# Patient Record
Sex: MALE | Race: WHITE | NOT HISPANIC OR LATINO | Employment: FULL TIME | URBAN - METROPOLITAN AREA
[De-identification: names, ages, dates, MRNs, and addresses within clinical notes are randomized per-mention and may not be internally consistent; named-entity substitution may affect disease eponyms.]

---

## 2017-06-19 ENCOUNTER — ALLSCRIPTS OFFICE VISIT (OUTPATIENT)
Dept: OTHER | Facility: OTHER | Age: 52
End: 2017-06-19

## 2018-01-13 VITALS
HEART RATE: 60 BPM | BODY MASS INDEX: 37.19 KG/M2 | TEMPERATURE: 97.7 F | WEIGHT: 315 LBS | RESPIRATION RATE: 18 BRPM | HEIGHT: 77 IN | DIASTOLIC BLOOD PRESSURE: 72 MMHG | SYSTOLIC BLOOD PRESSURE: 122 MMHG

## 2018-05-27 DIAGNOSIS — I10 ESSENTIAL HYPERTENSION: Primary | ICD-10-CM

## 2018-05-29 RX ORDER — IRBESARTAN 150 MG/1
TABLET ORAL
Qty: 90 TABLET | Refills: 0 | Status: SHIPPED | OUTPATIENT
Start: 2018-05-29 | End: 2018-08-27 | Stop reason: SDUPTHER

## 2018-05-29 RX ORDER — AMLODIPINE BESYLATE 5 MG/1
TABLET ORAL
COMMUNITY
Start: 2018-03-25 | End: 2018-05-30 | Stop reason: SDUPTHER

## 2018-05-29 RX ORDER — ALBUTEROL SULFATE 90 UG/1
2 AEROSOL, METERED RESPIRATORY (INHALATION)
COMMUNITY
End: 2018-05-30 | Stop reason: SDUPTHER

## 2018-05-29 RX ORDER — HYDROCHLOROTHIAZIDE 12.5 MG/1
TABLET ORAL
COMMUNITY
Start: 2018-03-07 | End: 2018-05-30 | Stop reason: SDUPTHER

## 2018-05-30 ENCOUNTER — OFFICE VISIT (OUTPATIENT)
Dept: FAMILY MEDICINE CLINIC | Facility: CLINIC | Age: 53
End: 2018-05-30
Payer: COMMERCIAL

## 2018-05-30 VITALS
HEART RATE: 92 BPM | TEMPERATURE: 97.3 F | WEIGHT: 315 LBS | RESPIRATION RATE: 16 BRPM | DIASTOLIC BLOOD PRESSURE: 80 MMHG | BODY MASS INDEX: 37.19 KG/M2 | SYSTOLIC BLOOD PRESSURE: 140 MMHG | HEIGHT: 77 IN

## 2018-05-30 DIAGNOSIS — Z12.11 SCREENING FOR COLON CANCER: ICD-10-CM

## 2018-05-30 DIAGNOSIS — J45.20 MILD INTERMITTENT ASTHMA WITHOUT COMPLICATION: Primary | ICD-10-CM

## 2018-05-30 DIAGNOSIS — E66.8 MODERATE OBESITY: ICD-10-CM

## 2018-05-30 DIAGNOSIS — N18.1 CKD (CHRONIC KIDNEY DISEASE) STAGE 1, GFR 90 ML/MIN OR GREATER: ICD-10-CM

## 2018-05-30 DIAGNOSIS — Z71.85 VACCINE COUNSELING: ICD-10-CM

## 2018-05-30 DIAGNOSIS — I10 BENIGN ESSENTIAL HYPERTENSION: ICD-10-CM

## 2018-05-30 PROBLEM — E66.9 MODERATE OBESITY: Status: ACTIVE | Noted: 2017-06-19

## 2018-05-30 PROCEDURE — 99214 OFFICE O/P EST MOD 30 MIN: CPT | Performed by: FAMILY MEDICINE

## 2018-05-30 PROCEDURE — 3008F BODY MASS INDEX DOCD: CPT | Performed by: FAMILY MEDICINE

## 2018-05-30 PROCEDURE — 1036F TOBACCO NON-USER: CPT | Performed by: FAMILY MEDICINE

## 2018-05-30 RX ORDER — AMLODIPINE BESYLATE 5 MG/1
5 TABLET ORAL DAILY
Qty: 90 TABLET | Refills: 3 | Status: SHIPPED | OUTPATIENT
Start: 2018-05-30 | End: 2019-03-02 | Stop reason: SDUPTHER

## 2018-05-30 RX ORDER — HYDROCHLOROTHIAZIDE 12.5 MG/1
12.5 TABLET ORAL DAILY
Qty: 90 TABLET | Refills: 3 | Status: SHIPPED | OUTPATIENT
Start: 2018-05-30 | End: 2019-03-02 | Stop reason: SDUPTHER

## 2018-05-30 RX ORDER — ALBUTEROL SULFATE 90 UG/1
2 AEROSOL, METERED RESPIRATORY (INHALATION) EVERY 4 HOURS PRN
Qty: 3 INHALER | Refills: 3 | Status: SHIPPED | OUTPATIENT
Start: 2018-05-30 | End: 2020-04-04 | Stop reason: SDUPTHER

## 2018-05-30 NOTE — PROGRESS NOTES
Chief Complaint   Patient presents with    Follow-up    Blood Pressure Check        Patient ID: Adele Allred  is a 46 y o  male  HPI  Pt is seeing for f/u Asthma, HTN, Obesity, CRD -  All stable, taking meds as Rx    The following portions of the patient's history were reviewed and updated as appropriate: allergies, current medications, past family history, past medical history, past social history, past surgical history and problem list     Review of Systems   Constitutional: Negative  Respiratory: Negative  Cardiovascular: Negative  Gastrointestinal: Negative  Genitourinary: Negative  Musculoskeletal: Negative  Skin: Negative  Neurological: Negative  Current Outpatient Prescriptions   Medication Sig Dispense Refill    albuterol (PROAIR HFA) 90 mcg/act inhaler Inhale 2 puffs every 4 (four) hours as needed for wheezing 3 Inhaler 3    amLODIPine (NORVASC) 5 mg tablet Take 1 tablet (5 mg total) by mouth daily 90 tablet 3    hydrochlorothiazide (HYDRODIURIL) 12 5 mg tablet Take 1 tablet (12 5 mg total) by mouth daily 90 tablet 3    irbesartan (AVAPRO) 150 mg tablet TAKE 1 TABLET DAILY 90 tablet 0    Mometasone Furo-Formoterol Fum (DULERA) 200-5 MCG/ACT AERO Inhale 2 puffs 2 (two) times a day 3 Inhaler 3     No current facility-administered medications for this visit  Objective:    /80 (BP Location: Left arm, Patient Position: Sitting, Cuff Size: Large)   Pulse 92   Temp (!) 97 3 °F (36 3 °C) (Tympanic)   Resp 16   Ht 6' 5" (1 956 m)   Wt (!) 164 kg (361 lb)   BMI 42 81 kg/m²        Physical Exam   Constitutional: He is oriented to person, place, and time  No distress  Obese    Cardiovascular: Normal rate and regular rhythm  No murmur heard  Pulmonary/Chest: Effort normal and breath sounds normal  No respiratory distress  He has no wheezes  Neurological: He is oriented to person, place, and time  No cranial nerve deficit     Psychiatric: He has a normal mood and affect  Labs in chart were reviewed  Assessment/Plan:         Diagnoses and all orders for this visit:    Mild intermittent asthma without complication  -     albuterol (PROAIR HFA) 90 mcg/act inhaler; Inhale 2 puffs every 4 (four) hours as needed for wheezing  -     Mometasone Furo-Formoterol Fum (DULERA) 200-5 MCG/ACT AERO; Inhale 2 puffs 2 (two) times a day    Benign essential hypertension  -     amLODIPine (NORVASC) 5 mg tablet; Take 1 tablet (5 mg total) by mouth daily  -     hydrochlorothiazide (HYDRODIURIL) 12 5 mg tablet; Take 1 tablet (12 5 mg total) by mouth daily  -     Comprehensive metabolic panel; Future  -     Lipid panel; Future  -     Comprehensive metabolic panel  -     Lipid panel  Weight loss, low Na diet, exercises advised   CKD (chronic kidney disease) stage 1, GFR 90 ml/min or greater    Moderate obesity  -     Hemoglobin A1c;  Future  -     Hemoglobin A1c    rto in 6 m                             Vinicius Sun MD

## 2018-05-31 NOTE — PATIENT INSTRUCTIONS
Low-Sodium Diet   AMBULATORY CARE:   A low-sodium diet  limits foods that are high in sodium (salt)  You will need to follow a low-sodium diet if you have high blood pressure, kidney disease, or heart failure  You may also need to follow this diet if you have a condition that is causing your body to retain (hold) extra fluid  You may need to limit the amount of sodium you eat to 1,500 mg  Ask your healthcare provider how much sodium you can have each day  How to use food labels to choose foods that are low in sodium:  Read food labels to find the amount of sodium they contain  The amount of sodium is listed in milligrams (mg)  The % Daily Value (DV) column tells you how much of your daily needs are met by 1 serving of the food for each nutrient listed  Choose foods that have less than 5% of the DV of sodium  These foods are considered low in sodium  Foods that have 20% or more of the DV of sodium are considered high in sodium  Some food labels may also list any of the following terms that tell you about the sodium content in the food:  · Sodium-free:  Less than 5 mg in each serving    · Very low sodium:  35 mg of sodium or less in each serving    · Low sodium:  140 mg of sodium or less in each serving    · Reduced sodium: At least 25% less sodium in each serving than the regular type    · Light in sodium:  50% less sodium in each serving    · Unsalted or no added salt:  No extra salt is added during processing (the food may still contain sodium)  Foods to avoid:  Salty foods are high in sodium   You should avoid the following:  · Processed foods:      ¨ Mixes for cornbread, biscuits, cake, and pudding     ¨ Instant foods, such as potatoes, cereals, noodles, and rice     ¨ Packaged foods, such as bread stuffing, rice and pasta mixes, snack dip mixes, and macaroni and cheese     ¨ Canned foods, such as canned vegetables, soups, broths, sauces, and vegetable or tomato juice    ¨ Snack foods, such as salted chips, popcorn, pretzels, pork rinds, salted crackers, and salted nuts    ¨ Frozen foods, such as dinners, entrees, vegetables with sauces, and breaded meats    ¨ Sauerkraut, pickled vegetables, and other foods prepared in brine    · Meats and cheeses:      ¨ Smoked or cured meat, such as corned beef, eldridge, ham, hot dogs, and sausage    ¨ Canned meats or spreads, such as potted meats, sardines, anchovies, and imitation seafood    ¨ Deli or lunch meats, such as bologna, ham, turkey, and roast beef    ¨ Processed cheese, such as American cheese and cheese spreads    · Condiments, sauces, and seasonings:      ¨ Salt (¼ teaspoon of salt contains 575 mg of sodium)    ¨ Seasonings made with salt, such as garlic salt, celery salt, onion salt, and seasoned salt    ¨ Regular soy sauce, barbecue sauce, teriyaki sauce, steak sauce, Worcestershire sauce, and most flavored vinegars    ¨ Canned gravy and mixes     ¨ Regular condiments, such as mustard, ketchup, and salad dressings    ¨ Pickles and olives    ¨ Meat tenderizers and monosodium glutamate (MSG)  Foods to include:  Read the food label to find the exact amount of sodium in each serving  · Bread and cereal:  Try to choose breads with less than 80 mg of sodium per serving  ¨ Bread, roll, argenis, tortilla, or unsalted crackers  ¨ Ready-to-eat cereals with less than 5% DV of sodium (examples include shredded wheat and puffed rice)    ¨ Pasta    · Vegetables and fruits:      ¨ Unsalted fresh, frozen, or canned vegetables    ¨ Fresh, frozen, or canned fruits    ¨ Fruit juice    · Dairy:  One serving has about 150 mg of sodium  ¨ Milk, all types    ¨ Yogurt    ¨ Hard cheese, such as cheddar, Swiss, [employer], or mozzarella    · Meat and other protein foods:  Some raw meats may have added sodium       ¨ Plain meats, fish, and poultry     ¨ Eggs    · Other foods:      ¨ Homemade pudding    ¨ Unsalted nuts, popcorn, or pretzels    ¨ Unsalted butter or margarine  Ways to decrease sodium:   · Add spices and herbs to foods instead of salt during cooking  Use salt-free seasonings to add flavor to foods  Examples include onion powder, garlic powder, basil, timmons powder, paprika, and parsley  Try lemon or lime juice or vinegar to give foods a tart flavor  Use hot peppers, pepper, or cayenne pepper to add a spicy flavor to foods  · Do not keep a salt shaker at your kitchen table  This may help keep you from adding salt to food at the table  It may take time to get used to enjoying the natural flavor of food instead of adding salt  Talk to your healthcare provider before you use salt substitutes  Some salt substitutes have a high amount of potassium and need to be avoided if you have kidney disease  · Choose low-sodium foods at restaurants  Meals from restaurants are often high in sodium  Some restaurants have nutrition information on the menu that tells you the amount of sodium in their foods  If possible, ask for your food to be prepared with less, or no salt  · Shop for unsalted or low-sodium foods and snacks at the grocery store  Examples include unsalted or low-sodium broths, soups, and canned vegetables  Choose fresh or frozen vegetables instead  Choose unsalted nuts or seeds or fresh fruits or vegetables as snacks  Read food labels and choose salt-free, very low-sodium, or low-sodium foods  © 2017 [employer] Information is for End User's use only and may not be sold, redistributed or otherwise used for commercial purposes  All illustrations and images included in CareNotes® are the copyrighted property of [employer]  or [employer]  The above information is an  only  It is not intended as medical advice for individual conditions or treatments  Talk to your doctor, nurse or pharmacist before following any medical regimen to see if it is safe and effective for you

## 2018-08-27 DIAGNOSIS — I10 ESSENTIAL HYPERTENSION: ICD-10-CM

## 2018-08-27 RX ORDER — IRBESARTAN 150 MG/1
TABLET ORAL
Qty: 90 TABLET | Refills: 3 | Status: SHIPPED | OUTPATIENT
Start: 2018-08-27 | End: 2019-03-02 | Stop reason: SDUPTHER

## 2019-01-09 ENCOUNTER — OFFICE VISIT (OUTPATIENT)
Dept: FAMILY MEDICINE CLINIC | Facility: CLINIC | Age: 54
End: 2019-01-09
Payer: COMMERCIAL

## 2019-01-09 VITALS
TEMPERATURE: 97.7 F | RESPIRATION RATE: 18 BRPM | SYSTOLIC BLOOD PRESSURE: 140 MMHG | BODY MASS INDEX: 37.19 KG/M2 | HEIGHT: 77 IN | WEIGHT: 315 LBS | DIASTOLIC BLOOD PRESSURE: 80 MMHG | HEART RATE: 84 BPM

## 2019-01-09 DIAGNOSIS — I10 BENIGN ESSENTIAL HYPERTENSION: Primary | ICD-10-CM

## 2019-01-09 DIAGNOSIS — E66.01 MORBID (SEVERE) OBESITY DUE TO EXCESS CALORIES (HCC): ICD-10-CM

## 2019-01-09 DIAGNOSIS — J45.20 MILD INTERMITTENT ASTHMA WITHOUT COMPLICATION: ICD-10-CM

## 2019-01-09 DIAGNOSIS — Z12.11 ENCOUNTER FOR SCREENING FOR MALIGNANT NEOPLASM OF COLON: ICD-10-CM

## 2019-01-09 DIAGNOSIS — N18.1 CKD (CHRONIC KIDNEY DISEASE) STAGE 1, GFR 90 ML/MIN OR GREATER: ICD-10-CM

## 2019-01-09 PROCEDURE — 99214 OFFICE O/P EST MOD 30 MIN: CPT | Performed by: FAMILY MEDICINE

## 2019-01-09 PROCEDURE — 1036F TOBACCO NON-USER: CPT | Performed by: FAMILY MEDICINE

## 2019-01-09 PROCEDURE — 3008F BODY MASS INDEX DOCD: CPT | Performed by: FAMILY MEDICINE

## 2019-01-09 NOTE — PROGRESS NOTES
Chief Complaint   Patient presents with    Follow-up    Blood Pressure Check   multiple issues      Patient ID: Debora Bowden is a 48 y o  male  HPI  Pt is seeing for f/u HTN, Asthma, MO, CRD -  All stable, lost 20 lb in 2 m with diet and exercises  -  Taking meds as Rx     The following portions of the patient's history were reviewed and updated as appropriate: allergies, current medications, past family history, past medical history, past social history, past surgical history and problem list     Review of Systems   Constitutional: Negative  Respiratory: Negative  Cardiovascular: Negative  Gastrointestinal: Negative  Genitourinary: Negative  Musculoskeletal: Negative  Skin: Negative  Neurological: Negative  Current Outpatient Prescriptions   Medication Sig Dispense Refill    albuterol (PROAIR HFA) 90 mcg/act inhaler Inhale 2 puffs every 4 (four) hours as needed for wheezing 3 Inhaler 3    amLODIPine (NORVASC) 5 mg tablet Take 1 tablet (5 mg total) by mouth daily 90 tablet 3    hydrochlorothiazide (HYDRODIURIL) 12 5 mg tablet Take 1 tablet (12 5 mg total) by mouth daily 90 tablet 3    irbesartan (AVAPRO) 150 mg tablet TAKE 1 TABLET DAILY 90 tablet 3    Mometasone Furo-Formoterol Fum (DULERA) 200-5 MCG/ACT AERO Inhale 2 puffs 2 (two) times a day 3 Inhaler 3     No current facility-administered medications for this visit  Objective:    /80 (BP Location: Left arm, Patient Position: Sitting, Cuff Size: Large)   Pulse 84   Temp 97 7 °F (36 5 °C) (Tympanic)   Resp 18   Ht 6' 5" (1 956 m)   Wt (!) 156 kg (344 lb)   BMI 40 79 kg/m²        Physical Exam   Constitutional: He is oriented to person, place, and time  No distress  Eyes: EOM are normal    Cardiovascular: Normal rate, regular rhythm and normal heart sounds  Exam reveals no gallop  No murmur heard  Pulmonary/Chest: Effort normal and breath sounds normal  No respiratory distress  He has no wheezes  Problem: Patient Care Overview (Adult)  Goal: Plan of Care Review  Outcome: Ongoing (interventions implemented as appropriate)    09/22/17 1034   Coping/Psychosocial Response Interventions   Plan Of Care Reviewed With patient   Patient Care Overview   Progress no change            He has no rales  Musculoskeletal: He exhibits no edema or tenderness  Neurological: He is oriented to person, place, and time  No cranial nerve deficit  Assessment/Plan:         Diagnoses and all orders for this visit:    Benign essential hypertension  -     Hemoglobin A1C; Future  -     Lipid panel; Future  -     Comprehensive metabolic panel; Future    Encounter for screening for malignant neoplasm of colon  -     Ambulatory referral to Gastroenterology;  Future    Mild intermittent asthma without complication    Morbid (severe) obesity due to excess calories (HCC)    CKD (chronic kidney disease) stage 1, GFR 90 ml/min or greater        All stable  Cont meds  Weight loss advised     rto in 6 m                     Paradise Stephen MD

## 2019-03-02 DIAGNOSIS — I10 BENIGN ESSENTIAL HYPERTENSION: ICD-10-CM

## 2019-03-02 DIAGNOSIS — I10 ESSENTIAL HYPERTENSION: ICD-10-CM

## 2019-03-02 NOTE — TELEPHONE ENCOUNTER
Dr Chang Mccullough    Patient is requesting refills on irbesartan, hydroclorothiazide and amlodipine sent to [facility]  Patient was advised that Dr Chang Mccullough is not in office today

## 2019-03-04 RX ORDER — AMLODIPINE BESYLATE 5 MG/1
5 TABLET ORAL DAILY
Qty: 90 TABLET | Refills: 3 | Status: SHIPPED | OUTPATIENT
Start: 2019-03-04 | End: 2020-04-17

## 2019-03-04 RX ORDER — HYDROCHLOROTHIAZIDE 12.5 MG/1
12.5 TABLET ORAL DAILY
Qty: 90 TABLET | Refills: 3 | Status: SHIPPED | OUTPATIENT
Start: 2019-03-04 | End: 2020-04-17

## 2019-03-04 RX ORDER — IRBESARTAN 150 MG/1
150 TABLET ORAL DAILY
Qty: 90 TABLET | Refills: 3 | Status: SHIPPED | OUTPATIENT
Start: 2019-03-04 | End: 2019-03-11 | Stop reason: ALTCHOICE

## 2019-03-09 ENCOUNTER — TELEPHONE (OUTPATIENT)
Dept: FAMILY MEDICINE CLINIC | Facility: CLINIC | Age: 54
End: 2019-03-09

## 2019-03-11 DIAGNOSIS — I10 BENIGN ESSENTIAL HYPERTENSION: Primary | ICD-10-CM

## 2019-03-11 RX ORDER — LOSARTAN POTASSIUM 100 MG/1
100 TABLET ORAL DAILY
Qty: 90 TABLET | Refills: 3 | Status: SHIPPED | OUTPATIENT
Start: 2019-03-11 | End: 2020-02-17

## 2019-03-27 ENCOUNTER — OFFICE VISIT (OUTPATIENT)
Dept: FAMILY MEDICINE CLINIC | Facility: CLINIC | Age: 54
End: 2019-03-27
Payer: COMMERCIAL

## 2019-03-27 VITALS
HEIGHT: 77 IN | DIASTOLIC BLOOD PRESSURE: 70 MMHG | WEIGHT: 315 LBS | HEART RATE: 72 BPM | RESPIRATION RATE: 14 BRPM | TEMPERATURE: 97.9 F | SYSTOLIC BLOOD PRESSURE: 120 MMHG | BODY MASS INDEX: 37.19 KG/M2

## 2019-03-27 DIAGNOSIS — H61.21 HEARING LOSS OF RIGHT EAR DUE TO CERUMEN IMPACTION: Primary | ICD-10-CM

## 2019-03-27 DIAGNOSIS — H60.592 OTHER NONINFECTIVE ACUTE OTITIS EXTERNA OF LEFT EAR: ICD-10-CM

## 2019-03-27 DIAGNOSIS — H61.21 RIGHT EAR IMPACTED CERUMEN: ICD-10-CM

## 2019-03-27 PROCEDURE — 99213 OFFICE O/P EST LOW 20 MIN: CPT | Performed by: NURSE PRACTITIONER

## 2019-03-27 PROCEDURE — 69210 REMOVE IMPACTED EAR WAX UNI: CPT | Performed by: NURSE PRACTITIONER

## 2019-03-27 PROCEDURE — 1036F TOBACCO NON-USER: CPT | Performed by: NURSE PRACTITIONER

## 2019-03-27 PROCEDURE — 3008F BODY MASS INDEX DOCD: CPT | Performed by: NURSE PRACTITIONER

## 2019-03-27 NOTE — PROGRESS NOTES
Ear cerumen removal  Date/Time: 3/27/2019 9:09 AM  Performed by: Flex Delgadillo by: [employer], [address]     Patient location:  Bedside  Indications / Diagnosis:  Right ear pain, hearing loss  Other Assisting Provider: Yes (comment) (AGNES, LPN)    Consent:     Consent obtained:  Verbal    Consent given by:  Patient    Risks discussed:  Bleeding, dizziness, infection, incomplete removal, pain and TM perforation    Alternatives discussed:  Alternative treatment (patient opts to proceed with in office wax removal)  Procedure details:     Local anesthetic:  None    Location:  R ear    Procedure type: curette      Approach:  Natural orifice  Post-procedure details:     Complication:  None    Hearing quality:  Improved    Patient tolerance of procedure:   Tolerated well, no immediate complications  Comments:      Left ext canal erythematous

## 2019-03-27 NOTE — PATIENT INSTRUCTIONS
Cerumen Impaction   WHAT YOU NEED TO KNOW:   Cerumen impaction is the blockage of the outer ear canal by tightly packed cerumen (earwax)  It is generally treated with procedures such as flushing or suctioning the ear canal or the use of instruments to remove the impaction  DISCHARGE INSTRUCTIONS:   Medicines:  · Ear drops: These are used to soften the wax in your ear  Wax softening ear drops may be bought without a prescription  Ask your healthcare provider how often you should use this medicine  Read the instructions carefully before you use the ear drops  Do the following when you put in ear drops:     ¨ Warm the drops by holding the bottle in your hands for a few minutes  Cold ear drops may make you dizzy  ¨ Lie down with the affected ear toward the ceiling  You may also stand with your head tilted to one side  ¨ Pull your ear lobe up and back, and place the correct number of drops into the ear  ¨ Keep your ear facing up for 5 to 10 minutes so the drops coat the outer ear canal      ¨ Gently clean the outer part of the ear with a cotton swab  Do not  place the cotton swab or anything inside your ear canal  This increases the risk of damaging your eardrum  · Take your medicine as directed  Contact your healthcare provider if you think your medicine is not helping or if you have side effects  Tell him of her if you are allergic to any medicine  Keep a list of the medicines, vitamins, and herbs you take  Include the amounts, and when and why you take them  Bring the list or the pill bottles to follow-up visits  Carry your medicine list with you in case of an emergency  Follow up with your healthcare provider as directed:  Write down your questions so you remember to ask them during your visits  Contact your healthcare provider if:   · You have a fever  · You have trouble hearing or ringing in your ear  · You have questions about your condition or care    Return to the emergency department if:   · You feel dizzy  · You have discharge or blood coming out of your ear  · Your ear pain does not go away or gets worse  © 2017 [employer] Information is for End User's use only and may not be sold, redistributed or otherwise used for commercial purposes  All illustrations and images included in CareNotes® are the copyrighted property of [employer]  or [employer]  The above information is an  only  It is not intended as medical advice for individual conditions or treatments  Talk to your doctor, nurse or pharmacist before following any medical regimen to see if it is safe and effective for you

## 2019-07-09 ENCOUNTER — TELEPHONE (OUTPATIENT)
Dept: FAMILY MEDICINE CLINIC | Facility: CLINIC | Age: 54
End: 2019-07-09

## 2019-07-09 NOTE — TELEPHONE ENCOUNTER
Attempted to call pt - voicemail is full, unable to leave [facility] is currently temporarily out of stock - is patient taking this medication? If so, we can send a therapeutic alternative

## 2019-10-07 ENCOUNTER — OFFICE VISIT (OUTPATIENT)
Dept: FAMILY MEDICINE CLINIC | Facility: CLINIC | Age: 54
End: 2019-10-07
Payer: COMMERCIAL

## 2019-10-07 VITALS
SYSTOLIC BLOOD PRESSURE: 128 MMHG | RESPIRATION RATE: 18 BRPM | TEMPERATURE: 98.5 F | HEIGHT: 77 IN | WEIGHT: 310.2 LBS | HEART RATE: 64 BPM | DIASTOLIC BLOOD PRESSURE: 70 MMHG | BODY MASS INDEX: 36.63 KG/M2

## 2019-10-07 DIAGNOSIS — Z23 NEED FOR INFLUENZA VACCINATION: ICD-10-CM

## 2019-10-07 DIAGNOSIS — M54.32 SCIATICA OF LEFT SIDE: Primary | ICD-10-CM

## 2019-10-07 PROCEDURE — 99214 OFFICE O/P EST MOD 30 MIN: CPT | Performed by: FAMILY MEDICINE

## 2019-10-07 PROCEDURE — 90682 RIV4 VACC RECOMBINANT DNA IM: CPT | Performed by: FAMILY MEDICINE

## 2019-10-07 PROCEDURE — 3008F BODY MASS INDEX DOCD: CPT | Performed by: FAMILY MEDICINE

## 2019-10-07 PROCEDURE — 90471 IMMUNIZATION ADMIN: CPT | Performed by: FAMILY MEDICINE

## 2019-10-07 RX ORDER — CYCLOBENZAPRINE HCL 10 MG
10 TABLET ORAL
Qty: 30 TABLET | Refills: 0 | Status: SHIPPED | OUTPATIENT
Start: 2019-10-07 | End: 2019-11-07 | Stop reason: SDUPTHER

## 2019-10-07 RX ORDER — METHYLPREDNISOLONE 4 MG/1
TABLET ORAL
Qty: 21 EACH | Refills: 0 | Status: SHIPPED | OUTPATIENT
Start: 2019-10-07 | End: 2019-12-07

## 2019-10-07 RX ORDER — ACETAMINOPHEN AND CODEINE PHOSPHATE 300; 30 MG/1; MG/1
1 TABLET ORAL EVERY 4 HOURS PRN
Qty: 30 TABLET | Refills: 0 | Status: SHIPPED | OUTPATIENT
Start: 2019-10-07 | End: 2020-07-25

## 2019-10-10 ENCOUNTER — EVALUATION (OUTPATIENT)
Dept: PHYSICAL THERAPY | Facility: CLINIC | Age: 54
End: 2019-10-10
Payer: COMMERCIAL

## 2019-10-10 DIAGNOSIS — M54.32 SCIATICA OF LEFT SIDE: ICD-10-CM

## 2019-10-10 PROCEDURE — 97112 NEUROMUSCULAR REEDUCATION: CPT | Performed by: PHYSICAL THERAPIST

## 2019-10-10 PROCEDURE — 97161 PT EVAL LOW COMPLEX 20 MIN: CPT | Performed by: PHYSICAL THERAPIST

## 2019-10-10 NOTE — PROGRESS NOTES
PT Evaluation     Today's date: 10/10/2019  Patient name: Pennelope Phalen  : 1965  MRN: [id number]  Referring provider: Janna Kaur MD  Dx:   Encounter Diagnosis     ICD-10-CM    1  Sciatica of left side M54 32 Ambulatory referral to Physical Therapy                  Assessment  Assessment details: Jony Reyes Sr presents with signs and symptoms consistent with Sciatica of left side, with loss of range of motion, strength and spinal stabilization  Presents with high reactivity  [facility]  would benefit with physical therapy to address these impairments to return to prior level of function  Impairments: abnormal or restricted ROM, activity intolerance, impaired physical strength, lacks appropriate home exercise program and pain with function    Goals  STG  Initiate HEP  Able to walk 1/2 mile without pain in 3 weeks  LTG  Independent with HEP  Able to walk 1 mile in 6 weeks      Plan  Planned therapy interventions: joint mobilization, manual therapy, neuromuscular re-education, patient education, strengthening, stretching, therapeutic exercise and home exercise program  Frequency: 2x week  Duration in visits: 12  Duration in weeks: 6  Treatment plan discussed with: patient        Subjective Evaluation    History of Present Illness  Mechanism of injury: Patient reports developing left leg radiating pain about 3 weeks ago, sleeping on a poor mattress  He is seeing a chiroprocter, and has never had this before            Recurrent probem    Pain  Current pain rating: 3  At best pain rating: 3  At worst pain ratin  Location: left low back to anterior thigh  Quality: cramping and radiating  Relieving factors: rest  Aggravating factors: standing and walking  Progression: no change    Treatments  Current treatment: physical therapy  Patient Goals  Patient goals for therapy: decreased pain, increased motion, increased strength and independence with ADLs/IADLs          Objective Concurrent Complaints  Negative for night pain, disturbed sleep, bladder dysfunction, bowel dysfunction, history of cancer, history of trauma and infection    Active Range of Motion     Lumbar   Flexion: 40 degrees  with pain  Extension: 10 degrees  with pain    Strength/Myotome Testing     Left Hip   Planes of Motion   Flexion: 5    Right Hip   Planes of Motion   Flexion: 5    Left Knee   Flexion: 5  Extension: 5    Right Knee   Flexion: 5  Extension: 5    Left Ankle/Foot   Dorsiflexion: 5  Plantar flexion: 5  Great toe extension: 5    Right Ankle/Foot   Dorsiflexion: 5  Plantar flexion: 5  Great toe extension: 5      Flowsheet Rows      Most Recent Value   PT/OT G-Codes   Current Score  32   Projected Score  60             Precautions: Asthma, HTN        Myofascial Chain Tests   Tests Left Side Right Side Comments Total   Supine Pelvic Tilt 0 p 0 d     Supine Bridging 0 p 0 d     Prone Bridging 0 p 0 d     Side-lying Hip Abduction 0 d 0 d     Side-lying Hip Adduction 0 p 0 d     Total Scores 0/15  0/15  Overall score 0/30

## 2019-10-14 ENCOUNTER — OFFICE VISIT (OUTPATIENT)
Dept: PHYSICAL THERAPY | Facility: CLINIC | Age: 54
End: 2019-10-14
Payer: COMMERCIAL

## 2019-10-14 DIAGNOSIS — M54.32 SCIATICA OF LEFT SIDE: Primary | ICD-10-CM

## 2019-10-14 PROCEDURE — 97112 NEUROMUSCULAR REEDUCATION: CPT

## 2019-10-14 PROCEDURE — 97140 MANUAL THERAPY 1/> REGIONS: CPT

## 2019-10-14 PROCEDURE — 97110 THERAPEUTIC EXERCISES: CPT

## 2019-10-14 NOTE — PROGRESS NOTES
Daily Note      Today's date: 10/14/2019  Patient name: Karen Singh  : 1965  MRN: [id number]  Referring provider: Lazarus Luther MD  Dx:   Encounter Diagnosis     ICD-10-CM    1  Sciatica of left side M54 32        Subjective: Pt reports that he is beginning to feel better but has increased soreness in his spine because he was weedwacking/doing yard work yesterday  Pt states he is able to push himself to walk farther  Pt also states he rode exercise bike for 3 miles and has soreness in L hip  Objective: See treatment diary below    Lumbar AROM     Flexion Fingertips 2" proximal to toes   Extension 50%   L lateral flexion 15% with pain   R lateral flexion 50%    L rotation 70%   R rotation 80%     Repeated motions Repetitions performed Centralized/  peripheralized? Effect on ROM   Flexion  10 Neither Increased   Extension 1 Peripheralized N/A       Assessment: Pt tolerated treatment fair  Pt attempted hamstring stretching and noted sharp increase in pain to 7/10  Pt introduced to TA activation with clamshells, bridging, and hip adduction  Provided pt with HEP including TA activation progression and lumbar flexion stretching  Plan: Progress treatment as tolerated            Precautions: Asthma, HTN    Daily Treatment Diary     Manual  10/10 10/14      STM B/L QL, lumbar paraspinals  Performed                                          Exercise Diary  EVAL 10/14      Recumbent bike --> 10'  L3-5              Flexion in sitting --> 15x (5s)      L hamstring stretch   HOLD      TA activation --> 10x (3s)      TA activation with clamshells --> 2x10  green      TA activation with bridging --> 10x      TA activation with hip adduction --> 10x                                                                                                                Pt edu/HEP  Performed              Time            Modalities

## 2019-10-17 ENCOUNTER — OFFICE VISIT (OUTPATIENT)
Dept: PHYSICAL THERAPY | Facility: CLINIC | Age: 54
End: 2019-10-17
Payer: COMMERCIAL

## 2019-10-17 DIAGNOSIS — M54.32 SCIATICA OF LEFT SIDE: Primary | ICD-10-CM

## 2019-10-17 PROCEDURE — 97112 NEUROMUSCULAR REEDUCATION: CPT | Performed by: PHYSICAL THERAPIST

## 2019-10-17 PROCEDURE — 97110 THERAPEUTIC EXERCISES: CPT | Performed by: PHYSICAL THERAPIST

## 2019-10-17 NOTE — PROGRESS NOTES
Daily Note     Today's date: 10/17/2019  Patient name: Viki Lanes  : 1965  MRN: [id number]  Referring provider: Edgar Lagos MD  Dx:   Encounter Diagnosis     ICD-10-CM    1  Sciatica of left side M54 32                   Subjective: Overall pain is less  Objective: See treatment diary below      Assessment: Tolerated treatment well  Patient demonstrated fatigue post treatment and exhibited good technique with therapeutic exercises      Plan: Continue per plan of care        Precautions: Asthma, HTN    Daily Treatment Diary     Manual  10/10 10/14      STM B/L QL, lumbar paraspinals  Performed                                          Exercise Diary  EVAL 10/14 10/17     Recumbent bike --> 10'  L3-5              Flexion in sitting --> 15x (5s)      L hamstring stretch   HOLD      TA activation --> 10x (3s)      TA activation with clamshells --> 2x10  green      TA activation with bridging --> 10x      TA activation with hip adduction --> 10x        Neurac supine pelvic lift   2x5     Neurac Bridge   2x5     Neurac SDLY Hip ABD   2x5     Neurac SDLY Hip ADD   2x5     PB Supine bridge   15x     Hamstring stretch w strap   2x5     Piriformis stretch w PB   2x5                                                     Pt edu/HEP  Performed              Time            Modalities

## 2019-10-24 ENCOUNTER — APPOINTMENT (OUTPATIENT)
Dept: PHYSICAL THERAPY | Facility: CLINIC | Age: 54
End: 2019-10-24
Payer: COMMERCIAL

## 2019-10-29 ENCOUNTER — OFFICE VISIT (OUTPATIENT)
Dept: PHYSICAL THERAPY | Facility: CLINIC | Age: 54
End: 2019-10-29
Payer: COMMERCIAL

## 2019-10-29 DIAGNOSIS — M54.32 SCIATICA OF LEFT SIDE: Primary | ICD-10-CM

## 2019-10-29 PROCEDURE — 97112 NEUROMUSCULAR REEDUCATION: CPT | Performed by: PHYSICAL THERAPIST

## 2019-10-29 NOTE — PROGRESS NOTES
Daily Note     Today's date: 10/29/2019  Patient name: Joaquín Glez  : 1965  MRN: [id number]  Referring provider: Maria C Lund MD  Dx:   Encounter Diagnosis     ICD-10-CM    1  Sciatica of left side M54 32                   Subjective: Overall back pain is much improved, numbness continues      Objective: See treatment diary below      Assessment: Tolerated treatment well  Patient demonstrated fatigue post treatment and exhibited good technique with therapeutic exercises      Plan: Continue per plan of care        Precautions: Asthma, HTN    Daily Treatment Diary     Manual  10/10 10/14      STM B/L QL, lumbar paraspinals  Performed                                          Exercise Diary  EVAL 10/14 10/17 10/29    Recumbent bike --> 10'  L3-5              Flexion in sitting --> 15x (5s)      L hamstring stretch   HOLD      TA activation --> 10x (3s)      TA activation with clamshells --> 2x10  green      TA activation with bridging --> 10x      TA activation with hip adduction --> 10x        Neurac supine pelvic lift   2x5 2x5    Neurac Bridge   2x5 2x5    Neurac SDLY Hip ABD   2x5 2x5    Neurac SDLY Hip ADD   2x5 2x5    PB Supine bridge   15x     Hamstring stretch w strap   2x5     Piriformis stretch w PB   2x5                                                     Pt edu/HEP  Performed              Time            Modalities

## 2019-10-31 ENCOUNTER — APPOINTMENT (OUTPATIENT)
Dept: PHYSICAL THERAPY | Facility: CLINIC | Age: 54
End: 2019-10-31
Payer: COMMERCIAL

## 2019-11-07 DIAGNOSIS — M54.32 SCIATICA OF LEFT SIDE: ICD-10-CM

## 2019-11-08 RX ORDER — CYCLOBENZAPRINE HCL 10 MG
10 TABLET ORAL
Qty: 90 TABLET | Refills: 0 | Status: SHIPPED | OUTPATIENT
Start: 2019-11-08 | End: 2020-07-25

## 2019-12-07 ENCOUNTER — OFFICE VISIT (OUTPATIENT)
Dept: FAMILY MEDICINE CLINIC | Facility: CLINIC | Age: 54
End: 2019-12-07
Payer: COMMERCIAL

## 2019-12-07 VITALS
DIASTOLIC BLOOD PRESSURE: 70 MMHG | WEIGHT: 305 LBS | HEART RATE: 72 BPM | HEIGHT: 77 IN | SYSTOLIC BLOOD PRESSURE: 110 MMHG | TEMPERATURE: 98.7 F | BODY MASS INDEX: 36.01 KG/M2 | RESPIRATION RATE: 14 BRPM

## 2019-12-07 DIAGNOSIS — I10 BENIGN ESSENTIAL HYPERTENSION: ICD-10-CM

## 2019-12-07 DIAGNOSIS — M54.42 ACUTE LEFT-SIDED LOW BACK PAIN WITH LEFT-SIDED SCIATICA: Primary | ICD-10-CM

## 2019-12-07 PROCEDURE — 3074F SYST BP LT 130 MM HG: CPT | Performed by: FAMILY MEDICINE

## 2019-12-07 PROCEDURE — 99213 OFFICE O/P EST LOW 20 MIN: CPT | Performed by: FAMILY MEDICINE

## 2019-12-07 PROCEDURE — 3078F DIAST BP <80 MM HG: CPT | Performed by: FAMILY MEDICINE

## 2019-12-07 RX ORDER — METHYLPREDNISOLONE 4 MG/1
TABLET ORAL
Qty: 21 EACH | Refills: 0 | Status: SHIPPED | OUTPATIENT
Start: 2019-12-07 | End: 2020-07-25

## 2019-12-07 NOTE — PROGRESS NOTES
Pennelope Phalen 1965 male MRN: [id number]    FAMILY PRACTICE OFFICE VISIT  [facility] - [address]      ASSESSMENT/PLAN  Pennelope Phalen  is a 47 y o  male presents to the office for    Diagnoses and all orders for this visit:    Acute left-sided low back pain with left-sided sciatica  -     XR spine lumbar minimum 4 views non injury; Future  -     methylPREDNISolone 4 MG tablet therapy pack; Use as directed on package    Benign essential hypertension    Hypertension very well controlled on medications continue as prescribed  Left-sided lower back pain with sciatic:  Will have an x-ray performed to be sure that there is no bulging discs leading to the reoccurrence of sciatic  Prescribed Medrol pack as well as discussed the side effects of recurrent use of Medrol leading to elevated sugars  No future appointments  SUBJECTIVE  CC: Back Pain (pt  c/o left side sciatica see's Dr Pedro Mayorga was in PT)      HPI:  Pennelope Phalen  is a 47 y o  male who presents for an acute appointment  Patient has had 3 bouts of sciatic left-sided pain since October  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  He denies any x-rays being performed in the past   Does go to a chiropractor for this sciatic pain with minimal improvement  Denies any trauma to the lumbar spine Was given Flexeril and barely uses it and felt more relief with steroids in the past   Does not have a history of diabetes  Patient has blood pressure very well managed and taking his medications appropriately  Review of Systems   Constitutional: Negative for activity change, appetite change, chills, fatigue and fever  HENT: Negative for congestion  Respiratory: Negative for cough, chest tightness and shortness of breath  Cardiovascular: Negative for chest pain and leg swelling     Gastrointestinal: Negative for abdominal distention, abdominal pain, constipation, diarrhea, nausea and vomiting  Musculoskeletal: Positive for back pain and gait problem  Neurological: Negative for headaches  All other systems reviewed and are negative        Historical Information   The patient history was reviewed as follows:  Past Medical History:   Diagnosis Date    Dental disorder     Last Assessed: 11/21/2015         Medications:     Current Outpatient Medications:     acetaminophen-codeine (TYLENOL with CODEINE #3) 300-30 MG per tablet, Take 1 tablet by mouth every 4 (four) hours as needed for moderate pain, Disp: 30 tablet, Rfl: 0    albuterol (PROAIR HFA) 90 mcg/act inhaler, Inhale 2 puffs every 4 (four) hours as needed for wheezing, Disp: 3 Inhaler, Rfl: 3    amLODIPine (NORVASC) 5 mg tablet, Take 1 tablet (5 mg total) by mouth daily, Disp: 90 tablet, Rfl: 3    cyclobenzaprine (FLEXERIL) 10 mg tablet, Take 1 tablet (10 mg total) by mouth daily at bedtime, Disp: 90 tablet, Rfl: 0    hydrochlorothiazide (HYDRODIURIL) 12 5 mg tablet, Take 1 tablet (12 5 mg total) by mouth daily, Disp: 90 tablet, Rfl: 3    losartan (COZAAR) 100 MG tablet, Take 1 tablet (100 mg total) by mouth daily, Disp: 90 tablet, Rfl: 3    Mometasone Furo-Formoterol Fum (DULERA) 200-5 MCG/ACT AERO, Inhale 2 puffs 2 (two) times a day, Disp: 3 Inhaler, Rfl: 3    methylPREDNISolone 4 MG tablet therapy pack, Use as directed on package (Patient not taking: Reported on 12/7/2019), Disp: 21 each, Rfl: 0    neomycin-polymyxin-hydrocortisone (CORTISPORIN) otic solution, Administer 4 drops into both ears every 6 (six) hours For 5 days (Patient not taking: Reported on 12/7/2019), Disp: 10 mL, Rfl: 0    No Known Allergies    OBJECTIVE  Vitals:   Vitals:    12/07/19 0937   BP: 110/70   BP Location: Left arm   Patient Position: Sitting   Cuff Size: Adult   Pulse: 72   Resp: 14   Temp: 98 7 °F (37 1 °C)   TempSrc: Tympanic   Weight: (!) 138 kg (305 lb)   Height: 6' 5" (1 956 m)         Physical Exam   Constitutional: He is oriented to person, place, and time  Vital signs are normal  He appears well-developed and well-nourished  HENT:   Head: Normocephalic and atraumatic  Eyes: Pupils are equal, round, and reactive to light  Conjunctivae and EOM are normal    Neck: Normal range of motion  Neck supple  Cardiovascular: Normal rate, regular rhythm, S1 normal, S2 normal, normal heart sounds and intact distal pulses  No murmur heard  Pulmonary/Chest: Effort normal and breath sounds normal  No respiratory distress  He has no wheezes  Musculoskeletal: Normal range of motion  He exhibits tenderness  He exhibits no edema  Back:    Neurological: He is alert and oriented to person, place, and time  He has normal strength  Skin: Skin is warm  Psychiatric: He has a normal mood and affect  His speech is normal and behavior is normal  Judgment and thought content normal    Vitals reviewed                   Sahra Draper MD,   [facility]  12/7/2019

## 2019-12-10 ENCOUNTER — APPOINTMENT (OUTPATIENT)
Dept: RADIOLOGY | Facility: CLINIC | Age: 54
End: 2019-12-10
Payer: COMMERCIAL

## 2019-12-10 DIAGNOSIS — M54.42 ACUTE LEFT-SIDED LOW BACK PAIN WITH LEFT-SIDED SCIATICA: ICD-10-CM

## 2019-12-10 PROCEDURE — 72110 X-RAY EXAM L-2 SPINE 4/>VWS: CPT

## 2019-12-12 ENCOUNTER — TELEPHONE (OUTPATIENT)
Dept: FAMILY MEDICINE CLINIC | Facility: CLINIC | Age: 54
End: 2019-12-12

## 2019-12-12 NOTE — TELEPHONE ENCOUNTER
----- Message from Ronnie Perez MD sent at 12/12/2019  6:01 PM EST -----  Please ask if the patient if his symptoms have been improving with the steroids that I provided him  Will forward to his PCP there is some mild arthritic changes in his spine but no disc bulging which I was concerned about

## 2019-12-19 ENCOUNTER — OFFICE VISIT (OUTPATIENT)
Dept: FAMILY MEDICINE CLINIC | Facility: CLINIC | Age: 54
End: 2019-12-19
Payer: COMMERCIAL

## 2019-12-19 VITALS
BODY MASS INDEX: 35.89 KG/M2 | WEIGHT: 304 LBS | TEMPERATURE: 98.7 F | HEIGHT: 77 IN | HEART RATE: 84 BPM | SYSTOLIC BLOOD PRESSURE: 140 MMHG | RESPIRATION RATE: 14 BRPM | DIASTOLIC BLOOD PRESSURE: 80 MMHG

## 2019-12-19 DIAGNOSIS — I10 BENIGN ESSENTIAL HYPERTENSION: ICD-10-CM

## 2019-12-19 DIAGNOSIS — M54.50 LUMBAR SPINE PAIN: Primary | ICD-10-CM

## 2019-12-19 PROCEDURE — 3008F BODY MASS INDEX DOCD: CPT | Performed by: FAMILY MEDICINE

## 2019-12-19 PROCEDURE — 99213 OFFICE O/P EST LOW 20 MIN: CPT | Performed by: FAMILY MEDICINE

## 2019-12-20 NOTE — PROGRESS NOTES
Flex Zhong 1965 male MRN: [id number]    [address] [place] [address]      ASSESSMENT/PLAN  Flex Zhong  is a 47 y o  male presents to the office for    1  Lumbar spine pain  Xray-> DJD mild  At this time given PO medication with no relief  - Ambulatory referral to Pain Management; Future    2  Benign essential hypertension  Elevated secondary to work out supplement  Continue medications  Education given on stop pre work out supplements  Repeat with PCP at next visit  Return to the office to see PCP as needed    No future appointments  SUBJECTIVE  CC: Follow-up (back pain review xrays)      HPI:  Flex Zhong  is a 47 y o  male who presents for a follow up after xrays  Xray performed demonstrated DJD of the lower spine MILD  Patient recently with 2nd flare with no relief with Flexeril or Medrol pack  Continue to have Left sciatic nerve pain  Denies any other neurological deficits Seeing chiropractor with minimal relief  Patient states that 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 his symptoms only get worse with rest   Improve when he is moving  Review of Systems   Constitutional: Negative for activity change, appetite change, chills, fatigue and fever  HENT: Negative for congestion  Respiratory: Negative for cough, chest tightness and shortness of breath  Cardiovascular: Negative for chest pain and leg swelling  Gastrointestinal: Negative for abdominal distention, abdominal pain, constipation, diarrhea, nausea and vomiting  Musculoskeletal: Positive for back pain and gait problem  All other systems reviewed and are negative        Historical Information   The patient history was reviewed as follows:  Past Medical History:   Diagnosis Date    Dental disorder     Last Assessed: 11/21/2015         Medications:     Current Outpatient Medications:     acetaminophen-codeine (TYLENOL with CODEINE #3) 300-30 MG per tablet, Take 1 tablet by mouth every 4 (four) hours as needed for moderate pain, Disp: 30 tablet, Rfl: 0    albuterol (PROAIR HFA) 90 mcg/act inhaler, Inhale 2 puffs every 4 (four) hours as needed for wheezing, Disp: 3 Inhaler, Rfl: 3    amLODIPine (NORVASC) 5 mg tablet, Take 1 tablet (5 mg total) by mouth daily, Disp: 90 tablet, Rfl: 3    cyclobenzaprine (FLEXERIL) 10 mg tablet, Take 1 tablet (10 mg total) by mouth daily at bedtime, Disp: 90 tablet, Rfl: 0    hydrochlorothiazide (HYDRODIURIL) 12 5 mg tablet, Take 1 tablet (12 5 mg total) by mouth daily, Disp: 90 tablet, Rfl: 3    losartan (COZAAR) 100 MG tablet, Take 1 tablet (100 mg total) by mouth daily, Disp: 90 tablet, Rfl: 3    Mometasone Furo-Formoterol Fum (DULERA) 200-5 MCG/ACT AERO, Inhale 2 puffs 2 (two) times a day, Disp: 3 Inhaler, Rfl: 3    methylPREDNISolone 4 MG tablet therapy pack, Use as directed on package (Patient not taking: Reported on 12/19/2019), Disp: 21 each, Rfl: 0    neomycin-polymyxin-hydrocortisone (CORTISPORIN) otic solution, Administer 4 drops into both ears every 6 (six) hours For 5 days (Patient not taking: Reported on 12/7/2019), Disp: 10 mL, Rfl: 0    No Known Allergies    OBJECTIVE  Vitals:   Vitals:    12/19/19 1841   BP: 140/80   BP Location: Left arm   Patient Position: Sitting   Cuff Size: Adult   Pulse: 84   Resp: 14   Temp: 98 7 °F (37 1 °C)   TempSrc: Tympanic   Weight: (!) 138 kg (304 lb)   Height: 6' 5" (1 956 m)         Physical Exam   Constitutional: He is oriented to person, place, and time  Vital signs are normal  He appears well-developed and well-nourished  HENT:   Head: Normocephalic and atraumatic  Eyes: Pupils are equal, round, and reactive to light  Conjunctivae and EOM are normal    Neck: Normal range of motion  Neck supple  Cardiovascular: Normal rate, regular rhythm, S1 normal, S2 normal, normal heart sounds and intact distal pulses  No murmur heard    Pulmonary/Chest: Effort normal and breath sounds normal  No respiratory distress  He has no wheezes  Musculoskeletal: Normal range of motion  He exhibits no edema or tenderness  Limping 2/2 pain however no acute tenderness over vertebra    Neurological: He is alert and oriented to person, place, and time  He has normal strength  Skin: Skin is warm  Psychiatric: He has a normal mood and affect  His speech is normal and behavior is normal  Judgment and thought content normal    Vitals reviewed                   Sandoval Floyd MD,   [facility]  12/19/2019

## 2020-02-16 DIAGNOSIS — I10 BENIGN ESSENTIAL HYPERTENSION: ICD-10-CM

## 2020-02-17 RX ORDER — LOSARTAN POTASSIUM 100 MG/1
TABLET ORAL
Qty: 90 TABLET | Refills: 1 | Status: SHIPPED | OUTPATIENT
Start: 2020-02-17 | End: 2020-08-14

## 2020-04-04 DIAGNOSIS — J45.20 MILD INTERMITTENT ASTHMA WITHOUT COMPLICATION: ICD-10-CM

## 2020-04-04 RX ORDER — ALBUTEROL SULFATE 90 UG/1
2 AEROSOL, METERED RESPIRATORY (INHALATION) EVERY 4 HOURS PRN
Qty: 3 INHALER | Refills: 3 | Status: SHIPPED | OUTPATIENT
Start: 2020-04-04 | End: 2021-03-30

## 2020-04-17 DIAGNOSIS — I10 BENIGN ESSENTIAL HYPERTENSION: ICD-10-CM

## 2020-04-17 RX ORDER — AMLODIPINE BESYLATE 5 MG/1
TABLET ORAL
Qty: 90 TABLET | Refills: 0 | Status: SHIPPED | OUTPATIENT
Start: 2020-04-17 | End: 2020-07-16

## 2020-04-17 RX ORDER — HYDROCHLOROTHIAZIDE 12.5 MG/1
TABLET ORAL
Qty: 90 TABLET | Refills: 0 | Status: SHIPPED | OUTPATIENT
Start: 2020-04-17 | End: 2020-07-16

## 2020-07-16 DIAGNOSIS — I10 BENIGN ESSENTIAL HYPERTENSION: ICD-10-CM

## 2020-07-16 RX ORDER — HYDROCHLOROTHIAZIDE 12.5 MG/1
TABLET ORAL
Qty: 90 TABLET | Refills: 0 | Status: SHIPPED | OUTPATIENT
Start: 2020-07-16 | End: 2020-10-14

## 2020-07-16 RX ORDER — AMLODIPINE BESYLATE 5 MG/1
TABLET ORAL
Qty: 90 TABLET | Refills: 0 | Status: SHIPPED | OUTPATIENT
Start: 2020-07-16 | End: 2020-10-14

## 2020-07-25 ENCOUNTER — OFFICE VISIT (OUTPATIENT)
Dept: FAMILY MEDICINE CLINIC | Facility: CLINIC | Age: 55
End: 2020-07-25
Payer: COMMERCIAL

## 2020-07-25 VITALS
BODY MASS INDEX: 36.84 KG/M2 | HEIGHT: 77 IN | TEMPERATURE: 98.9 F | RESPIRATION RATE: 16 BRPM | WEIGHT: 312 LBS | SYSTOLIC BLOOD PRESSURE: 130 MMHG | DIASTOLIC BLOOD PRESSURE: 80 MMHG | HEART RATE: 68 BPM

## 2020-07-25 DIAGNOSIS — N18.1 CKD (CHRONIC KIDNEY DISEASE) STAGE 1, GFR 90 ML/MIN OR GREATER: ICD-10-CM

## 2020-07-25 DIAGNOSIS — R73.01 IMPAIRED FASTING BLOOD SUGAR: ICD-10-CM

## 2020-07-25 DIAGNOSIS — Z12.11 SCREENING FOR COLON CANCER: ICD-10-CM

## 2020-07-25 DIAGNOSIS — E66.09 CLASS 2 OBESITY DUE TO EXCESS CALORIES WITHOUT SERIOUS COMORBIDITY WITH BODY MASS INDEX (BMI) OF 37.0 TO 37.9 IN ADULT: ICD-10-CM

## 2020-07-25 DIAGNOSIS — I10 BENIGN ESSENTIAL HYPERTENSION: Primary | ICD-10-CM

## 2020-07-25 PROBLEM — E66.812 CLASS 2 OBESITY DUE TO EXCESS CALORIES WITHOUT SERIOUS COMORBIDITY WITH BODY MASS INDEX (BMI) OF 37.0 TO 37.9 IN ADULT: Status: ACTIVE | Noted: 2017-06-19

## 2020-07-25 PROCEDURE — 3008F BODY MASS INDEX DOCD: CPT | Performed by: FAMILY MEDICINE

## 2020-07-25 PROCEDURE — 1036F TOBACCO NON-USER: CPT | Performed by: FAMILY MEDICINE

## 2020-07-25 PROCEDURE — 99214 OFFICE O/P EST MOD 30 MIN: CPT | Performed by: FAMILY MEDICINE

## 2020-07-25 PROCEDURE — 3075F SYST BP GE 130 - 139MM HG: CPT | Performed by: FAMILY MEDICINE

## 2020-07-25 PROCEDURE — 3079F DIAST BP 80-89 MM HG: CPT | Performed by: FAMILY MEDICINE

## 2020-07-25 NOTE — PROGRESS NOTES
Chief Complaint   Patient presents with    Blood Pressure Check    Follow-up     chronic conditions         Patient ID: Loulou Cobb is a 47 y o  male  HPI  Pt is seeing for f/u HTN, Obesity, IFG, elev Creatinine -  All stable, over due for labs -  Keeping weight down except fir gained 12 lb recently  ( was 400 lb, down to 295 lb with diet and exercises )  -  Restarted exercises at home when GYM was closed     The following portions of the patient's history were reviewed and updated as appropriate: allergies, current medications, past family history, past medical history, past social history, past surgical history and problem list     Review of Systems   Constitutional: Negative  Respiratory: Negative  Cardiovascular: Negative  Gastrointestinal: Negative  Genitourinary: Negative  Musculoskeletal: Negative  Skin: Negative  Neurological: Negative  Current Outpatient Medications   Medication Sig Dispense Refill    albuterol (ProAir HFA) 90 mcg/act inhaler Inhale 2 puffs every 4 (four) hours as needed for wheezing 3 Inhaler 3    amLODIPine (NORVASC) 5 mg tablet TAKE 1 TABLET DAILY 90 tablet 0    hydrochlorothiazide (HYDRODIURIL) 12 5 mg tablet TAKE 1 TABLET DAILY 90 tablet 0    losartan (COZAAR) 100 MG tablet TAKE 1 TABLET DAILY 90 tablet 1    mometasone-formoterol (Dulera) 200-5 MCG/ACT inhaler Inhale 2 puffs 2 (two) times a day 3 Inhaler 3     No current facility-administered medications for this visit  Objective:    /80 (BP Location: Left arm, Patient Position: Sitting, Cuff Size: Large)   Pulse 68   Temp 98 9 °F (37 2 °C) (Tympanic)   Resp 16   Ht 6' 5" (1 956 m)   Wt (!) 142 kg (312 lb)   BMI 37 00 kg/m²        Physical Exam   Constitutional: He is oriented to person, place, and time  No distress  Cardiovascular: Normal rate, regular rhythm and normal heart sounds  Exam reveals no gallop  No murmur heard    Pulmonary/Chest: Effort normal and breath sounds normal  No respiratory distress  He has no wheezes  He has no rales  Musculoskeletal: He exhibits no edema  Neurological: He is alert and oriented to person, place, and time  No cranial nerve deficit  Psychiatric: He has a normal mood and affect  Labs in chart were reviewed  no labs since 2016    Assessment/Plan:         Diagnoses and all orders for this visit:    Benign essential hypertension  -     Comprehensive metabolic panel; Future  -     Lipid panel; Future  -     TSH, 3rd generation; Future    Class 2 obesity due to excess calories without serious comorbidity with body mass index (BMI) of 37 0 to 37 9 in adult    CKD (chronic kidney disease) stage 1, GFR 90 ml/min or greater    Impaired fasting blood sugar  -     Hemoglobin A1C; Future    Screening for colon cancer  -     Ambulatory referral to Gastroenterology; Future  H/o colon polyps -  overdue for colonoscopy       All stable  Cont meds    BMI Counseling: Body mass index is 37 kg/m²  Discussed the patient's BMI with him  The BMI is above normal  Nutrition recommendations include reducing portion sizes, decreasing overall calorie intake, 3-5 servings of fruits/vegetables daily, reducing fast food intake and consuming healthier snacks  Exercise recommendations include exercising 3-5 times per week         rto in 6 m           Manasa Mayo MD

## 2020-08-14 DIAGNOSIS — I10 BENIGN ESSENTIAL HYPERTENSION: ICD-10-CM

## 2020-08-14 RX ORDER — LOSARTAN POTASSIUM 100 MG/1
TABLET ORAL
Qty: 90 TABLET | Refills: 3 | Status: SHIPPED | OUTPATIENT
Start: 2020-08-14 | End: 2021-06-24 | Stop reason: SDUPTHER

## 2020-10-06 ENCOUNTER — PREP FOR PROCEDURE (OUTPATIENT)
Dept: GASTROENTEROLOGY | Facility: CLINIC | Age: 55
End: 2020-10-06

## 2020-10-06 DIAGNOSIS — Z12.11 ENCOUNTER FOR SCREENING FOR MALIGNANT NEOPLASM OF COLON: ICD-10-CM

## 2020-10-06 DIAGNOSIS — Z11.59 ENCOUNTER FOR SCREENING FOR OTHER VIRAL DISEASES: Primary | ICD-10-CM

## 2020-10-14 DIAGNOSIS — I10 BENIGN ESSENTIAL HYPERTENSION: ICD-10-CM

## 2020-10-14 RX ORDER — AMLODIPINE BESYLATE 5 MG/1
TABLET ORAL
Qty: 90 TABLET | Refills: 3 | Status: SHIPPED | OUTPATIENT
Start: 2020-10-14 | End: 2021-08-02

## 2020-10-14 RX ORDER — HYDROCHLOROTHIAZIDE 12.5 MG/1
TABLET ORAL
Qty: 90 TABLET | Refills: 3 | Status: SHIPPED | OUTPATIENT
Start: 2020-10-14 | End: 2021-08-02

## 2020-11-21 ENCOUNTER — HOSPITAL ENCOUNTER (EMERGENCY)
Facility: HOSPITAL | Age: 55
Discharge: HOME | End: 2020-11-21
Attending: EMERGENCY MEDICINE | Admitting: EMERGENCY MEDICINE
Payer: COMMERCIAL

## 2020-11-21 ENCOUNTER — APPOINTMENT (EMERGENCY)
Dept: RADIOLOGY | Facility: HOSPITAL | Age: 55
End: 2020-11-21
Payer: COMMERCIAL

## 2020-11-21 VITALS
TEMPERATURE: 99.7 F | HEART RATE: 69 BPM | BODY MASS INDEX: 36.9 KG/M2 | RESPIRATION RATE: 20 BRPM | WEIGHT: 311.2 LBS | SYSTOLIC BLOOD PRESSURE: 150 MMHG | OXYGEN SATURATION: 97 % | DIASTOLIC BLOOD PRESSURE: 74 MMHG

## 2020-11-21 DIAGNOSIS — S52.209A ULNAR FRACTURE: Primary | ICD-10-CM

## 2020-11-21 PROCEDURE — 73090 X-RAY EXAM OF FOREARM: CPT

## 2020-11-21 PROCEDURE — 99284 EMERGENCY DEPT VISIT MOD MDM: CPT | Performed by: EMERGENCY MEDICINE

## 2020-11-21 PROCEDURE — 99283 EMERGENCY DEPT VISIT LOW MDM: CPT

## 2020-11-21 PROCEDURE — 29105 APPLICATION LONG ARM SPLINT: CPT | Performed by: EMERGENCY MEDICINE

## 2020-11-21 RX ORDER — OXYCODONE HYDROCHLORIDE AND ACETAMINOPHEN 5; 325 MG/1; MG/1
2 TABLET ORAL ONCE
Status: COMPLETED | OUTPATIENT
Start: 2020-11-21 | End: 2020-11-21

## 2020-11-21 RX ORDER — OXYCODONE HYDROCHLORIDE AND ACETAMINOPHEN 5; 325 MG/1; MG/1
1 TABLET ORAL EVERY 4 HOURS PRN
Qty: 20 TABLET | Refills: 0 | Status: SHIPPED | OUTPATIENT
Start: 2020-11-21 | End: 2020-11-28

## 2020-11-21 RX ADMIN — OXYCODONE HYDROCHLORIDE AND ACETAMINOPHEN 2 TABLET: 5; 325 TABLET ORAL at 12:33

## 2020-11-25 ENCOUNTER — APPOINTMENT (OUTPATIENT)
Dept: RADIOLOGY | Facility: CLINIC | Age: 55
End: 2020-11-25
Payer: COMMERCIAL

## 2020-11-25 ENCOUNTER — OFFICE VISIT (OUTPATIENT)
Dept: OBGYN CLINIC | Facility: CLINIC | Age: 55
End: 2020-11-25
Payer: COMMERCIAL

## 2020-11-25 VITALS
DIASTOLIC BLOOD PRESSURE: 76 MMHG | WEIGHT: 310 LBS | HEART RATE: 72 BPM | BODY MASS INDEX: 36.6 KG/M2 | SYSTOLIC BLOOD PRESSURE: 163 MMHG | HEIGHT: 77 IN

## 2020-11-25 DIAGNOSIS — S52.224A CLOSED NONDISPLACED TRANSVERSE FRACTURE OF SHAFT OF RIGHT ULNA, INITIAL ENCOUNTER: Primary | ICD-10-CM

## 2020-11-25 DIAGNOSIS — M25.521 PAIN IN RIGHT ELBOW: ICD-10-CM

## 2020-11-25 PROCEDURE — 3078F DIAST BP <80 MM HG: CPT | Performed by: ORTHOPAEDIC SURGERY

## 2020-11-25 PROCEDURE — 3008F BODY MASS INDEX DOCD: CPT | Performed by: ORTHOPAEDIC SURGERY

## 2020-11-25 PROCEDURE — 1036F TOBACCO NON-USER: CPT | Performed by: ORTHOPAEDIC SURGERY

## 2020-11-25 PROCEDURE — 99203 OFFICE O/P NEW LOW 30 MIN: CPT | Performed by: ORTHOPAEDIC SURGERY

## 2020-11-25 PROCEDURE — 73080 X-RAY EXAM OF ELBOW: CPT

## 2020-11-25 PROCEDURE — 3077F SYST BP >= 140 MM HG: CPT | Performed by: ORTHOPAEDIC SURGERY

## 2020-12-02 ENCOUNTER — OFFICE VISIT (OUTPATIENT)
Dept: OBGYN CLINIC | Facility: CLINIC | Age: 55
End: 2020-12-02
Payer: COMMERCIAL

## 2020-12-02 ENCOUNTER — APPOINTMENT (OUTPATIENT)
Dept: RADIOLOGY | Facility: CLINIC | Age: 55
End: 2020-12-02
Payer: COMMERCIAL

## 2020-12-02 VITALS
SYSTOLIC BLOOD PRESSURE: 174 MMHG | WEIGHT: 310 LBS | BODY MASS INDEX: 41.99 KG/M2 | HEART RATE: 85 BPM | HEIGHT: 72 IN | DIASTOLIC BLOOD PRESSURE: 82 MMHG

## 2020-12-02 DIAGNOSIS — S52.224A CLOSED NONDISPLACED TRANSVERSE FRACTURE OF SHAFT OF RIGHT ULNA, INITIAL ENCOUNTER: ICD-10-CM

## 2020-12-02 DIAGNOSIS — S52.224A CLOSED NONDISPLACED TRANSVERSE FRACTURE OF SHAFT OF RIGHT ULNA, INITIAL ENCOUNTER: Primary | ICD-10-CM

## 2020-12-02 PROCEDURE — 73090 X-RAY EXAM OF FOREARM: CPT

## 2020-12-02 PROCEDURE — 1036F TOBACCO NON-USER: CPT | Performed by: ORTHOPAEDIC SURGERY

## 2020-12-02 PROCEDURE — 99213 OFFICE O/P EST LOW 20 MIN: CPT | Performed by: ORTHOPAEDIC SURGERY

## 2020-12-02 PROCEDURE — 25530 CLTX ULNAR SHFT FX W/O MNPJ: CPT | Performed by: ORTHOPAEDIC SURGERY

## 2020-12-07 ENCOUNTER — TRANSCRIBE ORDERS (OUTPATIENT)
Dept: GASTROENTEROLOGY | Facility: CLINIC | Age: 55
End: 2020-12-07

## 2020-12-09 ENCOUNTER — OFFICE VISIT (OUTPATIENT)
Dept: OBGYN CLINIC | Facility: CLINIC | Age: 55
End: 2020-12-09

## 2020-12-09 ENCOUNTER — APPOINTMENT (OUTPATIENT)
Dept: RADIOLOGY | Facility: CLINIC | Age: 55
End: 2020-12-09
Payer: COMMERCIAL

## 2020-12-09 VITALS
BODY MASS INDEX: 41.99 KG/M2 | SYSTOLIC BLOOD PRESSURE: 161 MMHG | HEART RATE: 65 BPM | HEIGHT: 72 IN | DIASTOLIC BLOOD PRESSURE: 80 MMHG | WEIGHT: 310 LBS

## 2020-12-09 DIAGNOSIS — S52.224A CLOSED NONDISPLACED TRANSVERSE FRACTURE OF SHAFT OF RIGHT ULNA, INITIAL ENCOUNTER: Primary | ICD-10-CM

## 2020-12-09 DIAGNOSIS — S52.224A CLOSED NONDISPLACED TRANSVERSE FRACTURE OF SHAFT OF RIGHT ULNA, INITIAL ENCOUNTER: ICD-10-CM

## 2020-12-09 PROCEDURE — 3008F BODY MASS INDEX DOCD: CPT | Performed by: ORTHOPAEDIC SURGERY

## 2020-12-09 PROCEDURE — 99024 POSTOP FOLLOW-UP VISIT: CPT | Performed by: ORTHOPAEDIC SURGERY

## 2020-12-09 PROCEDURE — 73090 X-RAY EXAM OF FOREARM: CPT

## 2020-12-14 ENCOUNTER — TELEPHONE (OUTPATIENT)
Dept: OBGYN CLINIC | Facility: HOSPITAL | Age: 55
End: 2020-12-14

## 2020-12-23 ENCOUNTER — APPOINTMENT (OUTPATIENT)
Dept: RADIOLOGY | Facility: CLINIC | Age: 55
End: 2020-12-23
Payer: COMMERCIAL

## 2020-12-23 ENCOUNTER — OFFICE VISIT (OUTPATIENT)
Dept: OBGYN CLINIC | Facility: CLINIC | Age: 55
End: 2020-12-23

## 2020-12-23 VITALS
SYSTOLIC BLOOD PRESSURE: 148 MMHG | DIASTOLIC BLOOD PRESSURE: 78 MMHG | HEIGHT: 77 IN | BODY MASS INDEX: 36.84 KG/M2 | WEIGHT: 312 LBS | HEART RATE: 64 BPM

## 2020-12-23 DIAGNOSIS — S52.224A CLOSED NONDISPLACED TRANSVERSE FRACTURE OF SHAFT OF RIGHT ULNA, INITIAL ENCOUNTER: ICD-10-CM

## 2020-12-23 DIAGNOSIS — S52.224A CLOSED NONDISPLACED TRANSVERSE FRACTURE OF SHAFT OF RIGHT ULNA, INITIAL ENCOUNTER: Primary | ICD-10-CM

## 2020-12-23 DIAGNOSIS — L03.113 CELLULITIS OF RIGHT UPPER EXTREMITY: ICD-10-CM

## 2020-12-23 PROCEDURE — 99024 POSTOP FOLLOW-UP VISIT: CPT | Performed by: ORTHOPAEDIC SURGERY

## 2020-12-23 PROCEDURE — 73090 X-RAY EXAM OF FOREARM: CPT

## 2020-12-23 PROCEDURE — 3008F BODY MASS INDEX DOCD: CPT | Performed by: ORTHOPAEDIC SURGERY

## 2020-12-23 RX ORDER — CEPHALEXIN 500 MG/1
500 CAPSULE ORAL EVERY 8 HOURS SCHEDULED
Qty: 15 CAPSULE | Refills: 0 | Status: SHIPPED | OUTPATIENT
Start: 2020-12-23 | End: 2020-12-28

## 2021-01-06 ENCOUNTER — OFFICE VISIT (OUTPATIENT)
Dept: OBGYN CLINIC | Facility: CLINIC | Age: 56
End: 2021-01-06

## 2021-01-06 ENCOUNTER — APPOINTMENT (OUTPATIENT)
Dept: RADIOLOGY | Facility: CLINIC | Age: 56
End: 2021-01-06
Payer: COMMERCIAL

## 2021-01-06 VITALS
HEART RATE: 58 BPM | SYSTOLIC BLOOD PRESSURE: 145 MMHG | HEIGHT: 77 IN | DIASTOLIC BLOOD PRESSURE: 80 MMHG | BODY MASS INDEX: 36.84 KG/M2 | WEIGHT: 312 LBS

## 2021-01-06 DIAGNOSIS — S52.224A CLOSED NONDISPLACED TRANSVERSE FRACTURE OF SHAFT OF RIGHT ULNA, INITIAL ENCOUNTER: Primary | ICD-10-CM

## 2021-01-06 DIAGNOSIS — S52.224A CLOSED NONDISPLACED TRANSVERSE FRACTURE OF SHAFT OF RIGHT ULNA, INITIAL ENCOUNTER: ICD-10-CM

## 2021-01-06 DIAGNOSIS — M25.531 PAIN IN RIGHT WRIST: ICD-10-CM

## 2021-01-06 DIAGNOSIS — M25.631 WRIST STIFFNESS, RIGHT: ICD-10-CM

## 2021-01-06 PROCEDURE — 73090 X-RAY EXAM OF FOREARM: CPT

## 2021-01-06 PROCEDURE — 99024 POSTOP FOLLOW-UP VISIT: CPT | Performed by: ORTHOPAEDIC SURGERY

## 2021-01-06 PROCEDURE — 73110 X-RAY EXAM OF WRIST: CPT

## 2021-01-06 NOTE — PROGRESS NOTES
Assessment/Plan:  1  Closed nondisplaced transverse fracture of shaft of right ulna, initial encounter  XR forearm 2 vw right    Ambulatory referral to Occupational Therapy   2  Wrist stiffness, right  XR wrist 3+ vw right    Ambulatory referral to [address] [place] [address] has healing of his ulnar shaft fracture but   The fracture line continues to be visible on x-ray  He does have significant callus formation in this area  He has stiffness in the elbow, forearm and wrist after immobilization  Without significant pain in this area I do think we can be more aggressive with range of motion in his wrist and forearm  I have started him in occupational therapy as well  Given his significant stiffness and lack of healing I would like for Dr Huerta President to examine him this week to discuss any other treatment options which may be available  He will continue to remain out of work at this time  Subjective:   Rocky Chacon  is a 54 y o  male who presents To the office for follow-up for a nondisplaced ulnar shaft fracture  He is 6 weeks out from his initial injury which occurred after falling on stairs  At last office visit he had improved healing of his fracture visible on x-ray but was having significant swelling and discomfort in his hand and wrist   This was suspicious for cellulitis of the  Hand and he was treated with oral antibiotics  He states the antibiotics brought down the pain and swelling immediately  He finished antibiotics over 1 week and has reported improvement in his pain and swelling in the hand  He still reports stiffness in the wrist and hand  He was transitioned into an Exos cast and has been wearing it for the past 2 weeks  He has also been wearing a sling on top of the cast to immobilize the wrist and forearm           Past Medical History:   Diagnosis Date    Asthma     Dental disorder     Last Assessed: 11/21/2015    Hypertension        Past Surgical History:   Procedure Laterality Date    NO PAST SURGERIES         Family History   Problem Relation Age of Onset    Coronary artery disease Mother     No Known Problems Father        Social History     Occupational History    Occupation: [employer]   Tobacco Use    Smoking status: Former Smoker    Smokeless tobacco: Never Used   Substance and Sexual Activity    Alcohol use: Yes     Frequency: 2-3 times a week     Drinks per session: 5 or 6     Binge frequency: Weekly     Comment: 6 pack on weekends     Drug use: Never    Sexual activity: Not on file         Current Outpatient Medications:     albuterol (ProAir HFA) 90 mcg/act inhaler, Inhale 2 puffs every 4 (four) hours as needed for wheezing, Disp: 3 Inhaler, Rfl: 3    amLODIPine (NORVASC) 5 mg tablet, TAKE 1 TABLET DAILY, Disp: 90 tablet, Rfl: 3    hydrochlorothiazide (HYDRODIURIL) 12 5 mg tablet, TAKE 1 TABLET DAILY, Disp: 90 tablet, Rfl: 3    losartan (COZAAR) 100 MG tablet, TAKE 1 TABLET DAILY, Disp: 90 tablet, Rfl: 3    mometasone-formoterol (Dulera) 200-5 MCG/ACT inhaler, Inhale 2 puffs 2 (two) times a day, Disp: 3 Inhaler, Rfl: 3    No Known Allergies    Objective:  Vitals:    01/06/21 1531   BP: 145/80   Pulse: 58       Right Hand Exam     Tenderness   The patient is experiencing no tenderness  Range of Motion   Wrist   Extension:  10 abnormal   Flexion:  10 abnormal   Pronation:  10 abnormal   Supination:  10 abnormal     Muscle Strength   : 4/5     Other   Erythema: present  Sensation: normal  Pulse: present      Right Elbow Exam     Tenderness   The patient is experiencing no tenderness  Range of Motion   Extension:  -20 abnormal   Flexion:  50 abnormal   Pronation:  0 abnormal   Supination:  0 abnormal     Other   Erythema: absent  Sensation: normal  Pulse: present            Physical Exam  Vitals signs reviewed  Constitutional:       Appearance: He is well-developed  HENT:      Head: Normocephalic and atraumatic     Eyes:      Conjunctiva/sclera: Conjunctivae normal       Pupils: Pupils are equal, round, and reactive to light  Neck:      Musculoskeletal: Normal range of motion and neck supple  Cardiovascular:      Rate and Rhythm: Normal rate  Pulmonary:      Effort: Pulmonary effort is normal  No respiratory distress  Musculoskeletal:      Comments: As noted in HPI   Skin:     General: Skin is warm and dry  Neurological:      Mental Status: He is alert and oriented to person, place, and time  Psychiatric:         Behavior: Behavior normal          I have personally reviewed pertinent films in PACS and my interpretation is as follows: Three-view x-rays of the right wrist demonstrate rate no obvious abnormality  Two view x-rays of the right forearm demonstrate a stable, healing ulnar fracture

## 2021-01-08 ENCOUNTER — OFFICE VISIT (OUTPATIENT)
Dept: OBGYN CLINIC | Facility: CLINIC | Age: 56
End: 2021-01-08
Payer: COMMERCIAL

## 2021-01-08 VITALS
HEART RATE: 66 BPM | HEIGHT: 77 IN | BODY MASS INDEX: 36.84 KG/M2 | DIASTOLIC BLOOD PRESSURE: 83 MMHG | SYSTOLIC BLOOD PRESSURE: 146 MMHG | WEIGHT: 312 LBS

## 2021-01-08 DIAGNOSIS — S52.221D CLOSED DISPLACED TRANSVERSE FRACTURE OF SHAFT OF RIGHT ULNA WITH ROUTINE HEALING, SUBSEQUENT ENCOUNTER: Primary | ICD-10-CM

## 2021-01-08 PROCEDURE — 99214 OFFICE O/P EST MOD 30 MIN: CPT | Performed by: ORTHOPAEDIC SURGERY

## 2021-01-08 PROCEDURE — 1036F TOBACCO NON-USER: CPT | Performed by: ORTHOPAEDIC SURGERY

## 2021-01-08 NOTE — PROGRESS NOTES
ASSESSMENT/PLAN:      54 y o  male with a right mildly displaced ulnar shaft fracture, DOI 11/21/2020  It was discussed with Krystian Madera that his fracture is healing  He likely developed elbow and wrist stiffness secondary to sling and splint immobilization  He was advised to d/c the use of the sling  He does drink which can slow down healing  He will continue the use of the EXOCS brace over the next 2 weeks as he is still having tenderness over the fracture site  OT was re ordered for ROM/stretching exercises  He is NWB to his right hand  Follow up with myself or Dr Jd Rodriguez is 2 weeks time with repeat right forearm x-ray's  The patient verbalized understanding of exam findings and treatment plan  We engaged in the shared decision-making process and treatment options were discussed at length with the patient  Surgical and conservative management discussed today along with risks and benefits  Diagnoses and all orders for this visit:    Closed displaced transverse fracture of shaft of right ulna with routine healing, subsequent encounter  -     Ambulatory referral to PT/OT hand therapy; Future      Follow Up:  Return in about 2 weeks (around 1/22/2021), or with Dr Jd Rodriguez or myself  To Do Next Visit:  Re-evaluation of current issue and X-rays of the  right  forearm    ____________________________________________________________________________________________________________________________________________      CHIEF COMPLAINT:  Chief Complaint   Patient presents with    Right Wrist - Pain, Fracture       SUBJECTIVE:  Fidel Saunders  is a 54y o  year old  male who presents to the office today for a right ulnar shaft fracture  Skip fell on 11/21/2020, injuring his right forearm  He presented to the ED following the injury and then was seen by Dr Jd Rodriguez  Skip was fist immobilized in a long arm splint and a sling for aprox  2-3 weeks  Krystian Madera was then placed into a short arm cast for aprox  3 weeks   He was then transitioned into an EXOS brace  OT was ordered for ROM exercises  He notes stiffness to his fingers, wrist and elbow  I have personally reviewed all the relevant PMH, PSH, SH, FH, Medications and allergies  PAST MEDICAL HISTORY:  Past Medical History:   Diagnosis Date    Asthma     Dental disorder     Last Assessed: 11/21/2015    Hypertension        PAST SURGICAL HISTORY:  Past Surgical History:   Procedure Laterality Date    NO PAST SURGERIES         FAMILY HISTORY:  Family History   Problem Relation Age of Onset    Coronary artery disease Mother     No Known Problems Father        SOCIAL HISTORY:  Social History     Tobacco Use    Smoking status: Former Smoker    Smokeless tobacco: Never Used   Substance Use Topics    Alcohol use: Yes     Frequency: 2-3 times a week     Drinks per session: 5 or 6     Binge frequency: Weekly     Comment: 6 pack on weekends     Drug use: Never       MEDICATIONS:    Current Outpatient Medications:     albuterol (ProAir HFA) 90 mcg/act inhaler, Inhale 2 puffs every 4 (four) hours as needed for wheezing, Disp: 3 Inhaler, Rfl: 3    amLODIPine (NORVASC) 5 mg tablet, TAKE 1 TABLET DAILY, Disp: 90 tablet, Rfl: 3    hydrochlorothiazide (HYDRODIURIL) 12 5 mg tablet, TAKE 1 TABLET DAILY, Disp: 90 tablet, Rfl: 3    losartan (COZAAR) 100 MG tablet, TAKE 1 TABLET DAILY, Disp: 90 tablet, Rfl: 3    mometasone-formoterol (Dulera) 200-5 MCG/ACT inhaler, Inhale 2 puffs 2 (two) times a day, Disp: 3 Inhaler, Rfl: 3    ALLERGIES:  No Known Allergies    REVIEW OF SYSTEMS:  Review of Systems   Constitutional: Negative for chills, fever and unexpected weight change  HENT: Negative for hearing loss, nosebleeds and sore throat  Eyes: Negative for pain, redness and visual disturbance  Respiratory: Negative for cough, shortness of breath and wheezing  Cardiovascular: Negative for chest pain, palpitations and leg swelling     Gastrointestinal: Negative for abdominal pain, nausea and vomiting  Endocrine: Negative for polydipsia and polyuria  Genitourinary: Negative for difficulty urinating and hematuria  Musculoskeletal: Negative for arthralgias, joint swelling and myalgias  Skin: Negative for rash and wound  Neurological: Negative for dizziness, numbness and headaches  Psychiatric/Behavioral: Negative for decreased concentration, dysphoric mood and suicidal ideas  The patient is not nervous/anxious  VITALS:  Vitals:    01/08/21 1117   BP: 146/83   Pulse: 66       LABS:  HgA1c: No results found for: HGBA1C  BMP:   Lab Results   Component Value Date    GLUCOSE 112 (H) 12/10/2016    CALCIUM 9 4 12/10/2016     12/10/2016    K 4 8 12/10/2016    CO2 27 12/10/2016     12/10/2016    BUN 15 12/10/2016    CREATININE 1 27 12/10/2016       _____________________________________________________  PHYSICAL EXAMINATION:  General: well developed and well nourished, alert, oriented times 3 and appears comfortable  Psychiatric: Normal  HEENT: Normocephalic, Atraumatic Trachea Midline, No torticollis  Pulmonary: No audible wheezing or respiratory distress   Cardiovascular: No pitting edema, 2+ radial pulse   Skin: No masses, erythema, lacerations, fluctation, ulcerations  Neurovascular: Sensation Intact to the Median, Ulnar, Radial Nerve, Motor Intact to the Median, Ulnar, Radial Nerve and Pulses Intact  Musculoskeletal: Normal, except as noted in detailed exam and in HPI  MUSCULOSKELETAL EXAMINATION:    Right wrist/elbow:     No erythema or ecchymosis  Dorsal hand/forearm edema    Elbow ROM  degrees    Tender to palpation over fracture site   Unable to make a full composite fist   Compartments are soft   2+ radial pulse     ___________________________________________________  STUDIES REVIEWED:  I have personally reviewed AP lateral and oblique radiographs of right forearm which demonstrate mildly displaced ulnar shaft fracture with callus formation  PROCEDURES PERFORMED:  Procedures  No Procedures performed today    _____________________________________________________      Nellie Buck    I,:  Alesia Montana am acting as a scribe while in the presence of the attending physician :       I,:  Komal Hutchins DO personally performed the services described in this documentation    as scribed in my presence :

## 2021-01-14 ENCOUNTER — TELEPHONE (OUTPATIENT)
Dept: OBGYN CLINIC | Facility: CLINIC | Age: 56
End: 2021-01-14

## 2021-01-14 DIAGNOSIS — S52.221D CLOSED DISPLACED TRANSVERSE FRACTURE OF SHAFT OF RIGHT ULNA WITH ROUTINE HEALING, SUBSEQUENT ENCOUNTER: Primary | ICD-10-CM

## 2021-01-15 ENCOUNTER — TELEPHONE (OUTPATIENT)
Dept: OBGYN CLINIC | Facility: CLINIC | Age: 56
End: 2021-01-15

## 2021-01-15 NOTE — TELEPHONE ENCOUNTER
Patient came in asking for more information to be sent to [facility]  They need his medical records as well as a note stating what he can and cannot do as far as restrictions and limitations  They are not accepting a letter that just says he cannot return to work  [employer] fax #: [phone]

## 2021-01-15 NOTE — TELEPHONE ENCOUNTER
Called patient and advised that [facility] needs to send us a request in order for us to send the records  Patient verbalized understanding

## 2021-01-20 ENCOUNTER — EVALUATION (OUTPATIENT)
Dept: PHYSICAL THERAPY | Facility: CLINIC | Age: 56
End: 2021-01-20
Payer: COMMERCIAL

## 2021-01-20 DIAGNOSIS — M25.631 WRIST STIFFNESS, RIGHT: ICD-10-CM

## 2021-01-20 DIAGNOSIS — S52.224A CLOSED NONDISPLACED TRANSVERSE FRACTURE OF SHAFT OF RIGHT ULNA, INITIAL ENCOUNTER: ICD-10-CM

## 2021-01-20 PROCEDURE — 97110 THERAPEUTIC EXERCISES: CPT | Performed by: PHYSICAL THERAPIST

## 2021-01-20 PROCEDURE — 97162 PT EVAL MOD COMPLEX 30 MIN: CPT | Performed by: PHYSICAL THERAPIST

## 2021-01-20 NOTE — PROGRESS NOTES
PT Evaluation     Today's date: 2021  Patient name: Elena Luis  : 1965  MRN: [id number]  Referring provider: Tortsen Smart DO  Dx:   Encounter Diagnosis     ICD-10-CM    1  Closed nondisplaced transverse fracture of shaft of right ulna, initial encounter  S52 224A Ambulatory referral to Occupational Therapy   2  Wrist stiffness, right  M25 631 Ambulatory referral to Occupational Therapy                  Assessment  Assessment details: 2021: Kayden Khan Sr  is a 54 y o  male who presents with pain, decreased strength, decreased ROM and decreased joint mobility R wrist, hand, forearm, elbow and shoulder,  and joint effusion, severe weakness, moderate pain and edema  Due to these impairments, patient has difficulty performing ADL's, recreational activities, work-related activities, lifting/carrying, reaching  Patient's clinical presentation is consistent with their referring diagnosis of Closed nondisplaced transverse fracture of shaft of right ulna, initial encounter  He also c/o pain/poor ROM R shoulder which may be a result of wearing his sling for 6 weeks (poor active/passive elevation)  He is still wearing his brace and reports he was told to continue this until next follow up w/ MD  I reviewed w/ pt his MD note indicates brace to be D/C at the end of this week  Plan: Ambulatory referral to Physical therapy    Wrist stiffness, right  Plan: Ambulatory referral to Physical Therapy  Patient has been educated in home exercise program and plan of care   Patient would benefit from skilled physical therapy services to address their aforementioned functional limitations and progress towards prior level of function and independence with home exercise program      Impairments: abnormal or restricted ROM, activity intolerance, impaired physical strength, lacks appropriate home exercise program, pain with function, scapular dyskinesis and poor posture   Functional limitations: cannot use R hand for any functional activities, cannot eat, write, lift, grasp, or supinate to turn a paper over  Barriers to therapy: Pt is only allowed 12 PT visits per injury per calendar year  Understanding of Dx/Px/POC: good   Prognosis: fair    Goals  Short Term Goals to be met in 4 weeks (2/17/2021)  1  Pt to be independent w/ prelimary HEP  2  AROM R shoulder to be within 20 degrees of L shoulder to allow OH reaching and moving hand towards head to prepare for grooming  3  Improve elbow AROM to  to prepare for using R hand for eating  4  Improve AROM supination to neutral or better to prepare for eating  5  Pt to be able to make a full fist and oppose all fingers to thumb to prepare for grasping  6  Pt to D/C brace  Long Term Goals to be met in 12 weeks (4/14/2021)  1  AROM R shoulder to be within 5 degrees of L shoulder w/o pain to allow ease w/ ADL's and IADL's  2  Improve strength to 4+/5 or better to allow lifting, reaching and carrying w/o c/o   3  Improve R elbow AROM to 5-135 to allow reaching forward  4  Improve R forearm supination to 60 or better to allow pt to open doors and grasp/twist   6  Pt to establish  strength R hand of 45# or better to allow him to resume grasping/driving  7  Improve R wrist/forearm strength to 4/5 or better to allow grasping/carring/driving  8  Improvoe R elbow strength to 4/5 or better to allow carrying/pushing/pulling      Plan  Plan details:       Patient would benefit from: PT eval and skilled physical therapy  Planned modality interventions: cryotherapy, thermotherapy: hydrocollator packs and unattended electrical stimulation  Planned therapy interventions: manual therapy, neuromuscular re-education, therapeutic exercise, therapeutic activities, home exercise program, stretching, patient education and postural training  Frequency: 1x week  Plan of Care beginning date: 1/13/2021  Plan of Care expiration date: 4/14/2021  Treatment plan discussed with: patient        Subjective Evaluation    History of Present Illness  Date of onset: 2020  Mechanism of injury: 2021: pt was tripped by his dog and injured his wrist  Pt was initially placed in a splint for 2 weeks, then a cast for 3 weeks, then transitioned to a brace d He continued w/ a sling for one week with the brace as well  He discontinued the sling 2 weeks ago  His to wean off the brace by the time he sees Dr Heriberto Abdi again  He feels pain in his wrist and hand, but also in his neck/shoulder and feels he cannot elevate his arm well away from his side  He experienced numbness and tingling in all fingers for the first 6 weeks, this has resolved  Pain  Current pain rating: 3  At best pain ratin  At worst pain rating: 10  Pain location: R wrist, hand, elbow, shoulder and neck  Quality: dull ache, sharp and burning    Social Support    Employment status: not working (heavy equpt  [employer]  [employer] due to injury)        Objective    Flowsheet Rows      Most Recent Value   PT/OT G-Codes   Current Score  30   Projected Score  60      Objective:   AROM: standing  R   L      2021  Shoulder flexion  105*A/110P  160  Shoulder abduction  85*   160  Shoulder Andrew[employer]  45*   90  Shoulder IR   SIjt*   T10  Elbow flexion   105*   140  Elbow exten   -37*   +1  Supination   -40*   70  Pronation   65   85  Wrist exten   10*   80  Wrist flex   5*   55  Radial deviation  8*   28  unlar deviation   -4*   38    Opposition of L index to thumb only, cannot oppose other fingers  He cannot perform tendon glides  Fist AROM is 25%      STRENGTH:    R   L      2021    Shoulder flexion  3/5*   5/5  Shoulder abduction  3-/5*   5/5  Shoulder [email or web address]  3/5*   5/5  Elbow flexion   3/5   5/5  Elbow exten   3/5*   5/5  Supination   2+/5*   5/5  pronatation   3/5*   5/5  Wrist exten   3-/5*   5/5  Wrist flex   3-/5   5/5  RD    2+/5*   5/5  UD    2+/5   5/5   pounds   NT   115#    Edema: moderate through out R fingers/hand/forearm/elbow    Right  Left    1/20/2021 1/20/2021  Circum met heads 24 0  23 3  Circum RC jt  22 3cm 20    Joint mobility is poor RC, distal RU jt, elbow and GH joint  Precautions: ROM/stretching only  SOC: 1/20/2021  FOTO: 1/20/2021  POC expiration: 1/14/2021  Daily Treatment Log:  Date 1/20/2021       Visit # 1       Manual        Gentle dist to prox STM Next visit       Gentle PROM Next visit               There Exer         opposition Next visit       Wrist flex/ext suported 1x10       AROM to fist Partial 1x5       Supination/pronation stand 1x10       Supine tricep exten 1x10       Supine shld flexion AAROM OH 10x10"       MCP flexion AROM 1x10       RD/UD on ramp Next visit       Wrist exten elbow bent on table 1x10       Finger exten taps        HEP        NMRed                                                                                Modalities        MH to start  prn       CP to end prn                   Access Code: [email or web address]   URL: [employer]  [email or web address]   Date: 01/20/2021   Prepared by:  Bradley Teran     Exercises  Seated Finger MP Flexion and Wrist Extension AROM with Elbow Bent - 10 reps - 1 sets - 2x daily - 7x weekly  Seated Finger MP Flexion AROM and Wrist Extension - 10 reps - 2 sets - 2x daily - 7x weekly  Seated Full Fist AROM - 10 reps - 1 sets - 2x daily - 7x weekly  Supine Elbow Flexion Extension AROM - 10 reps - 2 sets - 2x daily - 7x weekly  Supine Shoulder Flexion PROM - 10 reps - 1 sets - 10 hold - 2x daily - 7x weekly  Seated Forearm Pronation and Supination AROM - 10 reps - 2 sets - 2x daily - 7x weekly

## 2021-01-27 ENCOUNTER — APPOINTMENT (OUTPATIENT)
Dept: RADIOLOGY | Facility: CLINIC | Age: 56
End: 2021-01-27
Payer: COMMERCIAL

## 2021-01-27 ENCOUNTER — OFFICE VISIT (OUTPATIENT)
Dept: PHYSICAL THERAPY | Facility: CLINIC | Age: 56
End: 2021-01-27
Payer: COMMERCIAL

## 2021-01-27 ENCOUNTER — OFFICE VISIT (OUTPATIENT)
Dept: OBGYN CLINIC | Facility: CLINIC | Age: 56
End: 2021-01-27

## 2021-01-27 VITALS
DIASTOLIC BLOOD PRESSURE: 78 MMHG | WEIGHT: 312 LBS | HEIGHT: 77 IN | BODY MASS INDEX: 36.84 KG/M2 | SYSTOLIC BLOOD PRESSURE: 159 MMHG | HEART RATE: 72 BPM

## 2021-01-27 DIAGNOSIS — S52.221G CLOSED DISPLACED TRANSVERSE FRACTURE OF SHAFT OF RIGHT ULNA WITH DELAYED HEALING, SUBSEQUENT ENCOUNTER: Primary | ICD-10-CM

## 2021-01-27 DIAGNOSIS — S52.224A CLOSED NONDISPLACED TRANSVERSE FRACTURE OF SHAFT OF RIGHT ULNA, INITIAL ENCOUNTER: Primary | ICD-10-CM

## 2021-01-27 DIAGNOSIS — S52.224A CLOSED NONDISPLACED TRANSVERSE FRACTURE OF SHAFT OF RIGHT ULNA, INITIAL ENCOUNTER: ICD-10-CM

## 2021-01-27 DIAGNOSIS — M25.631 WRIST STIFFNESS, RIGHT: ICD-10-CM

## 2021-01-27 PROCEDURE — 97140 MANUAL THERAPY 1/> REGIONS: CPT | Performed by: PHYSICAL THERAPIST

## 2021-01-27 PROCEDURE — 97110 THERAPEUTIC EXERCISES: CPT | Performed by: PHYSICAL THERAPIST

## 2021-01-27 PROCEDURE — 3008F BODY MASS INDEX DOCD: CPT | Performed by: ORTHOPAEDIC SURGERY

## 2021-01-27 PROCEDURE — 99024 POSTOP FOLLOW-UP VISIT: CPT | Performed by: ORTHOPAEDIC SURGERY

## 2021-01-27 PROCEDURE — 73090 X-RAY EXAM OF FOREARM: CPT

## 2021-01-27 NOTE — PROGRESS NOTES
Daily Note     Today's date: 2021  Patient name: Abdulaziz Niño  : 1965  MRN: [id number]  Referring provider: Glenn Tanner DO  Dx:   Encounter Diagnosis     ICD-10-CM    1  Closed nondisplaced transverse fracture of shaft of right ulna, initial encounter  S52 224A    2  Wrist stiffness, right  M25 631                   Subjective: Pt reports his upper trap, shoulder, elbow, wrist all still very painful  He just saw Dr Casi Adkins who advised him to D/C splint  Objective: See treatment diary below; updated HEP -advised pt 1x/week not sufficient given his severe ROM loss  Also educated pt for desensitization w/ towel  Therapist spoke w/ Dr Nura Craig who advised pt shows near full healing on Xray and to encourage ROM  Assessment: Tolerated treatment well  Patient expressed understanding of HEP updates  His ROM is poor, but some improvements from IE  Mod edema in hand  Plan: Continue per plan of care        Precautions: ROM/stretching only  SOC: 2021  FOTO: 2021  POC expiration: 2021  Daily Treatment Log:  Date 2021      Visit # 1 2      Manual  20'      Gentle dist to prox STM Next visit 5'      Gentle PROM wrist flex/ext; supin/pron; elbow flex/ext Next visit 12'      intermet glides  3'      There Exer  25'       opposition Next visit 1x5 each finger      Wrist flex/ext  1x10 supported 1x10 supported      Wrist fle/ext w/ self OP  1x10 ea supported      AROM to fist Partial 1x5 1x10      Supination/pronation stand 1x10 W/ self OP above wrist 1x10      Supine tricep exten 1x10 W/ AAROM exten 1x10      Supine shld flexion AAROM OH 10x10" 1x10      MCP flexion AROM 1x10 1x10 w/ self OP      RD/UD on ramp Next visit       Wrist fl/exten elbow bent on table 1x10       AAROM elbow flexion stand  1x10      HEP issued updated      NMRed                                                                                Modalities        MH to start  prn 5' pre - skin intact pre/post      CP to end prn                 Access Code: [email or web address]   URL: [employer]  [email or web address]   Date: 01/27/2021   Prepared by:  Karen Farr 90     Exercises  Seated Finger MP Flexion AROM and Wrist Extension - 10 reps - 2 sets - 2x daily - 7x weekly  Seated Full Fist AROM - 10 reps - 1 sets - 2x daily - 7x weekly  Supine Elbow Flexion Extension AROM - 10 reps - 2 sets - 2x daily - 7x weekly  Supine Shoulder Flexion PROM - 10 reps - 1 sets - 10 hold - 2x daily - 7x weekly  Seated Wrist Flexion with Overpressure - 10 reps - 1 sets - 2 hold - 2x daily - 7x weekly  Seated Wrist Extension PROM - 10 reps - 1 sets - 5 hold - 2x daily - 7x weekly  Forearm Supination Stretch - 10 reps - 1 sets - 5 hold - 2x daily - 7x weekly  Elbow Flexion PROM - 10 reps - 1 sets - 5 hold - 2x daily - 7x weekly  Towel Roll  on Table - 10 reps - 1 sets - 5 hold - 2x daily - 7x weekly  Sidelying Shoulder Abduction Full Range of Motion - 10 reps - 1 sets - 2x daily - 7x weekly

## 2021-01-27 NOTE — PROGRESS NOTES
Assessment/Plan:  1  Closed displaced transverse fracture of shaft of right ulna with delayed healing, subsequent encounter  XR forearm 2 vw right       Skip appears to have increased callus formation and stable alignment of his fracture with delayed healing  He appears to have  Increased healing compared to last office visit and I do think it is safe for him to come out of the cast and increase his range of motion with physical therapy  He will remain out of work at this time and we will try to advance his range of motion at the wrist and elbow  He will continue to be nonweightbearing and follow up in the office in 3 weeks for repeat x-ray and evaluation  Subjective:   Eduarda Bernard  is a 54 y o  male who presents To the office for follow-up for a ulnar shaft fracture  He is 8 weeks out from his initial injury  He did have some delayed healing and he has been kept in a Exos cast since his last office visit  He had some callus formation and stable alignment at last visit  We did have him see Dr Juan Sahu to evaluate his stiffness in his wrist, elbow and assess his fracture healing  Dr Juan Sahu recommended continued PT and continued Exos cast until today's visit  He has been out of work since this injury  He states that he continues to have stiffness and pain from his elbow down to his wrist   He denies any new injury           Past Medical History:   Diagnosis Date    Asthma     Dental disorder     Last Assessed: 11/21/2015    Hypertension        Past Surgical History:   Procedure Laterality Date    NO PAST SURGERIES         Family History   Problem Relation Age of Onset    Coronary artery disease Mother     No Known Problems Father        Social History     Occupational History    Occupation: [employer]   Tobacco Use    Smoking status: Former Smoker    Smokeless tobacco: Never Used   Substance and Sexual Activity    Alcohol use: Yes     Frequency: 2-3 times a week     Drinks per session: 5 or 6 Binge frequency: Weekly     Comment: 6 pack on weekends     Drug use: Never    Sexual activity: Not on file         Current Outpatient Medications:     albuterol (ProAir HFA) 90 mcg/act inhaler, Inhale 2 puffs every 4 (four) hours as needed for wheezing, Disp: 3 Inhaler, Rfl: 3    amLODIPine (NORVASC) 5 mg tablet, TAKE 1 TABLET DAILY, Disp: 90 tablet, Rfl: 3    hydrochlorothiazide (HYDRODIURIL) 12 5 mg tablet, TAKE 1 TABLET DAILY, Disp: 90 tablet, Rfl: 3    losartan (COZAAR) 100 MG tablet, TAKE 1 TABLET DAILY, Disp: 90 tablet, Rfl: 3    mometasone-formoterol (Dulera) 200-5 MCG/ACT inhaler, Inhale 2 puffs 2 (two) times a day, Disp: 3 Inhaler, Rfl: 3    No Known Allergies    Objective:  Vitals:    01/27/21 1117   BP: 159/78   Pulse: 72       Right Hand Exam     Range of Motion   Wrist   Extension:  5 abnormal   Flexion:  10 abnormal   Pronation:  10 abnormal   Supination:  10 abnormal     Muscle Strength   Wrist extension: 4/5   Wrist flexion: 4/5   : 5/5     Other   Erythema: absent  Sensation: normal  Pulse: present      Right Elbow Exam     Tenderness   The patient is experiencing no tenderness  Range of Motion   Extension:  -20 abnormal   Flexion:  120 abnormal     Muscle Strength   Pronation:  4/5   Supination:  4/5     Other   Erythema: absent  Sensation: normal  Pulse: present    Comments:   Tenderness to palpation over ulnar shaft fracture site          Strength/Myotome Testing     Right Wrist/Hand   Wrist extension: 4  Wrist flexion: 4      Physical Exam  Vitals signs reviewed  Constitutional:       Appearance: He is well-developed  HENT:      Head: Normocephalic and atraumatic  Eyes:      Conjunctiva/sclera: Conjunctivae normal       Pupils: Pupils are equal, round, and reactive to light  Neck:      Musculoskeletal: Normal range of motion and neck supple  Cardiovascular:      Rate and Rhythm: Normal rate     Pulmonary:      Effort: Pulmonary effort is normal  No respiratory distress  Musculoskeletal:      Comments: As noted in HPI   Skin:     General: Skin is warm and dry  Neurological:      Mental Status: He is alert and oriented to person, place, and time     Psychiatric:         Behavior: Behavior normal            I have personally reviewed pertinent films in PACS and my interpretation is as follows:   three-view x-ray of the right forearm in the office today demonstrates interval healing and increased callus formation in the  Ulnar shaft fracture

## 2021-01-27 NOTE — LETTER
January 27, 2021     Patient: Frances Wild  YOB: 1965   Date of Visit: 1/27/2021       To Whom it May Concern:    Digna Davey is under my professional care  He was seen in my office on 1/27/2021  No work at this time  Estimated return to work on 2/18/21  If you have any questions or concerns, please don't hesitate to call           Sincerely,          Tegan Nugent, DO

## 2021-02-03 ENCOUNTER — OFFICE VISIT (OUTPATIENT)
Dept: PHYSICAL THERAPY | Facility: CLINIC | Age: 56
End: 2021-02-03
Payer: COMMERCIAL

## 2021-02-03 DIAGNOSIS — M25.631 WRIST STIFFNESS, RIGHT: ICD-10-CM

## 2021-02-03 DIAGNOSIS — S52.224A CLOSED NONDISPLACED TRANSVERSE FRACTURE OF SHAFT OF RIGHT ULNA, INITIAL ENCOUNTER: Primary | ICD-10-CM

## 2021-02-03 PROCEDURE — 97140 MANUAL THERAPY 1/> REGIONS: CPT | Performed by: PHYSICAL THERAPIST

## 2021-02-03 PROCEDURE — 97110 THERAPEUTIC EXERCISES: CPT | Performed by: PHYSICAL THERAPIST

## 2021-02-03 NOTE — PROGRESS NOTES
Daily Note     Today's date: 2/3/2021  Patient name: Raymond Salcedo  : 1965  MRN: [id number]  Referring provider: Simi Sanchez DO  Dx:   Encounter Diagnosis     ICD-10-CM    1  Closed nondisplaced transverse fracture of shaft of right ulna, initial encounter  S52 224A    2  Wrist stiffness, right  M25 631                   Subjective: Pt reports no swelling in his hand first thing in the morning, but his hand/wrist/forearm swell as the day goes on  He reports HEP compliance w/ difficulty  Objective: See treatment diary below; pt was reminded about elevating his hand during the day for edema  He was encouraged to do ROM slower, to full end range for his ROM as he is still quite limited  Assessment: Tolerated treatment fair and better than last visit, but still has ERP w/ all finger, wrist, forearm, elbow and shoulder movements  Patient remains very limited w/ wrist flex/ext, finger flexion, and supination  Applied trial of Ktape for edema management w/ instruction to remove in 2 days if still on  Tolerance to manual PROM remains fair at best due to c/o pain  Plan: Continue per plan of care    Emphasize ROM     Precautions: ROM/stretching only  SOC: 2021  FOTO: 2021  POC expiration: 2021  Daily Treatment Log:  Date 2021 2021 2/3/2021     Visit # 1 2 3     Manual  20' 20'     Gentle dist to prox STM Next visit 5' 5'     Gentle PROM wrist flex/ext; supin/pron; elbow flex/ext Next visit 12' Sit and supine 10'     intermet glides  3'      ktape edema   5' applied     There Exer  25' 25'      opposition Next visit 1x5 each finger 2x5     Wrist flex/ext  1x10 supported 1x10 supported 1x10 w/ ramp     Wrist fle/ext w/ self OP  1x10 ea supported 1x10 man OP     AROM to fist Partial 1x5 1x10 2x5 w/ man OP     Supination/pronation stand 1x10 W/ self OP above wrist 1x10 W/ self OP 5"x10     Supine tricep exten 1x10 W/ AAROM exten 1x10 AROM w/ self elbow support 2x10 Supine shld flexion AAROM OH 10x10" 1x10 2x10     MCP flexion AROM 1x10 1x10 w/ self OP 1x10 w/ man OP     AAROM elbow flexion stand  1x10 AROM 2x10      towel roll   2x10     HEP issued updated      NMRed                                                                                Modalities        MH to start forearm/hand prn 5' pre - skin intact pre/post 5 pre- skin intact pre/post     CP to end prn                 Access Code: [email or web address]   URL: [employer]  [email or web address]   Date: 01/27/2021   Prepared by:  Lynn Slice     Exercises  Seated Finger MP Flexion AROM and Wrist Extension - 10 reps - 2 sets - 2x daily - 7x weekly  Seated Full Fist AROM - 10 reps - 1 sets - 2x daily - 7x weekly  Supine Elbow Flexion Extension AROM - 10 reps - 2 sets - 2x daily - 7x weekly  Supine Shoulder Flexion PROM - 10 reps - 1 sets - 10 hold - 2x daily - 7x weekly  Seated Wrist Flexion with Overpressure - 10 reps - 1 sets - 2 hold - 2x daily - 7x weekly  Seated Wrist Extension PROM - 10 reps - 1 sets - 5 hold - 2x daily - 7x weekly  Forearm Supination Stretch - 10 reps - 1 sets - 5 hold - 2x daily - 7x weekly  Elbow Flexion PROM - 10 reps - 1 sets - 5 hold - 2x daily - 7x weekly  Towel Roll  on Table - 10 reps - 1 sets - 5 hold - 2x daily - 7x weekly  Sidelying Shoulder Abduction Full Range of Motion - 10 reps - 1 sets - 2x daily - 7x weekly

## 2021-02-05 ENCOUNTER — OFFICE VISIT (OUTPATIENT)
Dept: PHYSICAL THERAPY | Facility: CLINIC | Age: 56
End: 2021-02-05
Payer: COMMERCIAL

## 2021-02-05 DIAGNOSIS — M25.631 WRIST STIFFNESS, RIGHT: ICD-10-CM

## 2021-02-05 DIAGNOSIS — S52.224A CLOSED NONDISPLACED TRANSVERSE FRACTURE OF SHAFT OF RIGHT ULNA, INITIAL ENCOUNTER: Primary | ICD-10-CM

## 2021-02-05 PROCEDURE — 97140 MANUAL THERAPY 1/> REGIONS: CPT

## 2021-02-05 PROCEDURE — 97110 THERAPEUTIC EXERCISES: CPT

## 2021-02-05 NOTE — PROGRESS NOTES
Daily Note     Today's date: 2021  Patient name: Hector Reese  : 1965  MRN: [id number]  Referring provider: Luis De La Cruz DO  Dx:   Encounter Diagnosis     ICD-10-CM    1  Closed nondisplaced transverse fracture of shaft of right ulna, initial encounter  S52 224A    2  Wrist stiffness, right  M25 631                   Subjective: pt reports he is trying to force himself to use his arm more with tasks at home  His shoulder and his bicep have been more painful than usual  His dog jumped up and bent his wrist down further than he has been bending it and that was painful, other than that he thinks it is about the same in regard to ROM  Objective: See treatment diary below      Assessment: Tolerated treatment fair  Patient would benefit from continued PT pt with very limited wrist PROM due to increases in pain yadira during manual therapy  Some improvement in finger AROM but oppositions and full fist remain limited and difficult  Plan: Continue per plan of care        Precautions: ROM/stretching only  SOC: 2021  FOTO: 2021  POC expiration: 2021  Daily Treatment Log:  Date 2021 2021 2/3/2021 2021    Visit # 1 2 3 4     Manual  20' 20' 15'     Gentle dist to prox STM Next visit 5' 5' 5'     Gentle PROM wrist flex/ext; supin/pron; elbow flex/ext Next visit 12' Sit and supine 10'  Sit and supine 10'     intermet glides  3'      ktape edema   5' applied Irritated skin     There Exer  25' 25' 25'     opposition Next visit 1x5 each finger 2x5 2x5     Wrist flex/ext  1x10 supported 1x10 supported 1x10 w/ ramp 1x10 w/ ramp     Wrist flex/ext w/ self OP  1x10 ea supported 1x10 man OP 1x10 man OP     AROM to fist Partial 1x5 1x10 2x5 w/ man OP 2x5 w/ man OP w/ place and hold    Supination/pronation stand 1x10 W/ self OP above wrist 1x10 W/ self OP 5"x10 W/ self OP 5"x10     Supine tricep exten 1x10 W/ AAROM exten 1x10 AROM w/ self elbow support 2x10 AROM w/ self elbow support 2x10     Supine shld flexion AAROM OH 10x10" 1x10 2x10 2x10     MCP flexion AROM 1x10 1x10 w/ self OP 1x10 w/ man OP 2x5 man OP     AAROM elbow flexion stand  1x10 AROM 2x10 AROM 2x10      towel roll   2x10 2x10     HEP issued updated      NMRed                                                                                Modalities        MH to start forearm/hand prn 5' pre - skin intact pre/post 5 pre- skin intact pre/post 5 pre- skin intact pre/post     CP to end prn                 Access Code: [email or web address]   URL: [employer]  [email or web address]   Date: 01/27/2021   Prepared by:  [employer]     Exercises  Seated Finger MP Flexion AROM and Wrist Extension - 10 reps - 2 sets - 2x daily - 7x weekly  Seated Full Fist AROM - 10 reps - 1 sets - 2x daily - 7x weekly  Supine Elbow Flexion Extension AROM - 10 reps - 2 sets - 2x daily - 7x weekly  Supine Shoulder Flexion PROM - 10 reps - 1 sets - 10 hold - 2x daily - 7x weekly  Seated Wrist Flexion with Overpressure - 10 reps - 1 sets - 2 hold - 2x daily - 7x weekly  Seated Wrist Extension PROM - 10 reps - 1 sets - 5 hold - 2x daily - 7x weekly  Forearm Supination Stretch - 10 reps - 1 sets - 5 hold - 2x daily - 7x weekly  Elbow Flexion PROM - 10 reps - 1 sets - 5 hold - 2x daily - 7x weekly  Towel Roll  on Table - 10 reps - 1 sets - 5 hold - 2x daily - 7x weekly  Sidelying Shoulder Abduction Full Range of Motion - 10 reps - 1 sets - 2x daily - 7x weekly

## 2021-02-08 ENCOUNTER — OFFICE VISIT (OUTPATIENT)
Dept: PHYSICAL THERAPY | Facility: CLINIC | Age: 56
End: 2021-02-08
Payer: COMMERCIAL

## 2021-02-08 DIAGNOSIS — S52.224A CLOSED NONDISPLACED TRANSVERSE FRACTURE OF SHAFT OF RIGHT ULNA, INITIAL ENCOUNTER: Primary | ICD-10-CM

## 2021-02-08 DIAGNOSIS — M25.631 WRIST STIFFNESS, RIGHT: ICD-10-CM

## 2021-02-08 PROCEDURE — 97110 THERAPEUTIC EXERCISES: CPT | Performed by: PHYSICAL THERAPIST

## 2021-02-08 PROCEDURE — 97140 MANUAL THERAPY 1/> REGIONS: CPT | Performed by: PHYSICAL THERAPIST

## 2021-02-08 NOTE — PROGRESS NOTES
Daily Note     Today's date: 2021  Patient name: Red Hirsch  : 1965  MRN: [id number]  Referring provider: Juancarlos Drew DO  Dx:   Encounter Diagnosis     ICD-10-CM    1  Closed nondisplaced transverse fracture of shaft of right ulna, initial encounter  S52 224A    2  Wrist stiffness, right  M25 631                   Subjective: Pt reports continued pain and difficulty w/ R UE ROM hand/wrist/forearm/elbow/shoulder  He reports increased efforts w/ HEP  Objective: See treatment diary below      Assessment: Tolerated treatment fair  Patient does demonstrate some improvement in hand/finger flexion, wrist flex/ext and supination, though all are still geo gould  Plan: Continue per plan of care  Progress treament per protocol  Pt encouraged to stretch aggressively at home       Precautions: ROM/stretching only  SOC: 2021  FOTO: 2021  POC expiration: 2021  Daily Treatment Log:  Date 2021 2021 2/3/2021 2021 2021   Visit # 1 2 3 4  5   Manual  20' 20' 15'  15'   Gentle dist to prox STM Next visit 5' 5' 5'  5'   Gentle PROM wrist flex/ext; supin/pron; elbow flex/ext Next visit 12' Sit and supine 10'  Sit and supine 10'  Sit and 10' supine   intermet glides  3'      ktape edema   5' applied Irritated skin     There Exer  25' 25' 25' 30'    opposition Next visit 1x5 each finger 2x5 2x5     Wrist flex/ext  1x10 supported 1x10 supported 1x10 w/ ramp 1x10 w/ ramp  2x10 w/ ramp   Wrist flex/ext w/ self OP  1x10 ea supported 1x10 man OP 1x10 man OP  1x10 w/ man OP   AROM to fist Partial 1x5 1x10 2x5 w/ man OP 2x5 w/ man OP w/ place and hold 2x5 w/ man OP place & hold   Supination/pronation stand 1x10 W/ self OP above wrist 1x10 W/ self OP 5"x10 W/ self OP 5"x10  AROM 1x10; W/ self OP stand 1x10   Supine tricep exten 1x10 W/ AAROM exten 1x10 AROM w/ self elbow support 2x10 AROM w/ self elbow support 2x10  1# cuff 2x10   Supine shld flexion AAROM OH 10x10" 1x10 2x10 2x10 W/ cane 2x10   Sup static elbow exten stretch w/ cuff wgt     2# 1'x2   MCP flexion AROM 1x10 1x10 w/ self OP 1x10 w/ man OP 2x5 man OP     AAROM elbow flexion stand  1x10 AROM 2x10 AROM 2x10      towel roll   2x10 2x10     Wrist ext str palm on table     5"x10   HEP issued updated      NMRed                                                                                Modalities        MH to start forearm/hand prn 5' pre - skin intact pre/post 5 pre- skin intact pre/post 5 pre- skin intact pre/post     CP to end prn                 Access Code: [email or web address]   URL: [email or web address]   Date: 01/27/2021   Prepared by:  Spring Kang     Exercises  Seated Finger MP Flexion AROM and Wrist Extension - 10 reps - 2 sets - 2x daily - 7x weekly  Seated Full Fist AROM - 10 reps - 1 sets - 2x daily - 7x weekly  Supine Elbow Flexion Extension AROM - 10 reps - 2 sets - 2x daily - 7x weekly  Supine Shoulder Flexion PROM - 10 reps - 1 sets - 10 hold - 2x daily - 7x weekly  Seated Wrist Flexion with Overpressure - 10 reps - 1 sets - 2 hold - 2x daily - 7x weekly  Seated Wrist Extension PROM - 10 reps - 1 sets - 5 hold - 2x daily - 7x weekly  Forearm Supination Stretch - 10 reps - 1 sets - 5 hold - 2x daily - 7x weekly  Elbow Flexion PROM - 10 reps - 1 sets - 5 hold - 2x daily - 7x weekly  Towel Roll  on Table - 10 reps - 1 sets - 5 hold - 2x daily - 7x weekly  Sidelying Shoulder Abduction Full Range of Motion - 10 reps - 1 sets - 2x daily - 7x weekly

## 2021-02-10 ENCOUNTER — OFFICE VISIT (OUTPATIENT)
Dept: PHYSICAL THERAPY | Facility: CLINIC | Age: 56
End: 2021-02-10
Payer: COMMERCIAL

## 2021-02-10 DIAGNOSIS — M25.631 WRIST STIFFNESS, RIGHT: ICD-10-CM

## 2021-02-10 DIAGNOSIS — S52.224A CLOSED NONDISPLACED TRANSVERSE FRACTURE OF SHAFT OF RIGHT ULNA, INITIAL ENCOUNTER: Primary | ICD-10-CM

## 2021-02-10 PROCEDURE — 97110 THERAPEUTIC EXERCISES: CPT

## 2021-02-10 PROCEDURE — 97140 MANUAL THERAPY 1/> REGIONS: CPT

## 2021-02-10 NOTE — PROGRESS NOTES
Daily Note     Today's date: 2/10/2021  Patient name: Wong Teran  : 1965  MRN: [id number]  Referring provider: Rebeka Nevarez DO  Dx:   Encounter Diagnosis     ICD-10-CM    1  Closed nondisplaced transverse fracture of shaft of right ulna, initial encounter  S52 224A    2  Wrist stiffness, right  M25 631                   Subjective: shoulder pain at rest and having difficulty getting comfortable at night  Reports no real change since last visit, cont to have burning on the ulnar side of where the break was  Pt repots trying to use his hand more at home and stretching regularly  Sees dr Edson Epstein next week  Objective: See treatment diary below      Assessment: Tolerated treatment fair  Patient would benefit from continued PT pt cont with significant deficits in Passive and AROM in R wrist limited by tightness and pain  Plan: Continue per plan of care        Precautions: ROM/stretching only  SOC: 2021  FOTO: 2021  POC expiration: 2021  Daily Treatment Log:  Date 2/10//2021       Visit # 6       Manual        Gentle dist to prox STM 5'        Gentle PROM wrist flex/ext; supin/pron; elbow flex/ext Sit and 10' supine       intermet glides        ktape edema        There Exer         opposition        Wrist flex/ext  2x10 on ramp       Wrist flex/ext w/ self OP 1x10 w/ man OP       AROM to fist 2x5 w/ man OP place & hold       Supination/pronation stand AROM 1x10; w  Self OP stand 2x10        Supine tricep exten 1# cw 2x10       Supine shld flexion AAROM OH W/ cane 2x10        Sup static elbow exten stretch w/ cuff wgt 2# 1'x2        MCP flexion AROM 2x5 w/ man OP        AAROM elbow flexion stand AROM 2x10         towel roll 2x10        Wrist ext str palm on table 5"x10        HEP        NMRed                                                                                Modalities        MH to start forearm/hand 6' pre session; skin intact pre and post        CP to end Access Code: [email or web address]   URL: [email or web address]  [email or web address]   Date: 01/27/2021   Prepared by:  Karen Farr 90     Exercises  Seated Finger MP Flexion AROM and Wrist Extension - 10 reps - 2 sets - 2x daily - 7x weekly  Seated Full Fist AROM - 10 reps - 1 sets - 2x daily - 7x weekly  Supine Elbow Flexion Extension AROM - 10 reps - 2 sets - 2x daily - 7x weekly  Supine Shoulder Flexion PROM - 10 reps - 1 sets - 10 hold - 2x daily - 7x weekly  Seated Wrist Flexion with Overpressure - 10 reps - 1 sets - 2 hold - 2x daily - 7x weekly  Seated Wrist Extension PROM - 10 reps - 1 sets - 5 hold - 2x daily - 7x weekly  Forearm Supination Stretch - 10 reps - 1 sets - 5 hold - 2x daily - 7x weekly  Elbow Flexion PROM - 10 reps - 1 sets - 5 hold - 2x daily - 7x weekly  Towel Roll  on Table - 10 reps - 1 sets - 5 hold - 2x daily - 7x weekly  Sidelying Shoulder Abduction Full Range of Motion - 10 reps - 1 sets - 2x daily - 7x weekly

## 2021-02-15 ENCOUNTER — APPOINTMENT (OUTPATIENT)
Dept: PHYSICAL THERAPY | Facility: CLINIC | Age: 56
End: 2021-02-15
Payer: COMMERCIAL

## 2021-02-16 ENCOUNTER — EVALUATION (OUTPATIENT)
Dept: PHYSICAL THERAPY | Facility: CLINIC | Age: 56
End: 2021-02-16
Payer: COMMERCIAL

## 2021-02-16 DIAGNOSIS — M25.631 WRIST STIFFNESS, RIGHT: ICD-10-CM

## 2021-02-16 DIAGNOSIS — S52.224A CLOSED NONDISPLACED TRANSVERSE FRACTURE OF SHAFT OF RIGHT ULNA, INITIAL ENCOUNTER: Primary | ICD-10-CM

## 2021-02-16 PROCEDURE — 97110 THERAPEUTIC EXERCISES: CPT | Performed by: PHYSICAL THERAPIST

## 2021-02-16 PROCEDURE — 97140 MANUAL THERAPY 1/> REGIONS: CPT | Performed by: PHYSICAL THERAPIST

## 2021-02-16 NOTE — PROGRESS NOTES
PT Re-Evaluation     Today's date: 2021  Patient name: Humberto Pierre  : 1965  MRN: [id number]  Referring provider: Puma Torre DO  Dx:   Encounter Diagnosis     ICD-10-CM    1  Closed nondisplaced transverse fracture of shaft of right ulna, initial encounter  S52 224A    2  Wrist stiffness, right  M25 631                   Assessment  Assessment details: 2021: Emily Hobbs Sr  is a 54 y o  male who presents with pain, decreased strength, decreased ROM and decreased joint mobility R wrist, hand, forearm, elbow and shoulder,  and joint effusion, severe weakness, moderate pain and edema  Due to these impairments, patient has difficulty performing ADL's, recreational activities, work-related activities, lifting/carrying, reaching  Patient's clinical presentation is consistent with their referring diagnosis of Closed nondisplaced transverse fracture of shaft of right ulna, initial encounter  He also c/o pain/poor ROM R shoulder which may be a result of wearing his sling for 6 weeks (poor active/passive elevation)  He is still wearing his brace and reports he was told to continue this until next follow up w/ MD  I reviewed w/ pt his MD note indicates brace to be D/C at the end of this week  Plan: Ambulatory referral to Physical therapy    Wrist stiffness, right  Plan: Ambulatory referral to Physical Therapy  Patient has been educated in home exercise program and plan of care  Patient would benefit from skilled physical therapy services to address their aforementioned functional limitations and progress towards prior level of function and independence with home exercise program     2021: Pt demonstrates improvement in hand, wrist, forearm and elbow ROM, but he remains quite limited with tight end feel  He reports compliance w/ HEP  He was encouraged to be more aggressive with self stretching at home as soft tissue tightness appears to be a large contributing factor   RC and RU joint mobility remains poor  Pt reports continued mod to high pain level and tolerance to manual stretching in clinic is fair due to pain  His HEP was updated today  His hand ROM is not sufficient for grasping, forearm supination to neutral now  He continues w/ mild/mod hand edema  Cervical screen is negative for - derangement not suspected - shoulder ROM limitation is also limited by soft tissue tightness likely due to immobilization  Impairments: abnormal or restricted ROM, activity intolerance, impaired physical strength, lacks appropriate home exercise program, pain with function, scapular dyskinesis and poor posture   Functional limitations: cannot use R hand for grasping/writing/tying shoes - can zip jacket; cannot supinate to turn a paper over; cannot elevate arm into abd OHBarriers to therapy: Pt is only allowed 12 PT visits per injury per calendar year  Understanding of Dx/Px/POC: good   Prognosis: fair    Goals  Short Term Goals to be met in 4 weeks (2/17/2021)  1  Pt to be independent w/ prelimary HEP - not met  2  AROM R shoulder to be within 20 degrees of L shoulder to allow OH reaching and moving hand towards head to prepare for grooming  - not met  3  Improve elbow AROM to  to prepare for using R hand for eating - partially met  4  Improve AROM supination to neutral or better to prepare for eating  - met  5  Pt to be able to make a full fist and oppose all fingers to thumb to prepare for grasping  - not met  6  Pt to D/C brace  - met    Long Term Goals to be met in 12 weeks (4/14/2021) - progressing toward  1  AROM R shoulder to be within 5 degrees of L shoulder w/o pain to allow ease w/ ADL's and IADL's  2  Improve strength to 4+/5 or better to allow lifting, reaching and carrying w/o c/o   3  Improve R elbow AROM to 5-135 to allow reaching forward    4  Improve R forearm supination to 60 or better to allow pt to open doors and grasp/twist   6  Pt to establish  strength R hand of 45# or better to allow him to resume grasping/driving  7  Improve R wrist/forearm strength to 4/5 or better to allow grasping/carring/driving  8  Improvoe R elbow strength to 4/5 or better to allow carrying/pushing/pulling  Plan  Plan details:       Patient would benefit from: PT eval and skilled physical therapy  Planned modality interventions: cryotherapy, thermotherapy: hydrocollator packs and unattended electrical stimulation  Planned therapy interventions: manual therapy, neuromuscular re-education, therapeutic exercise, therapeutic activities, home exercise program, stretching, patient education and postural training  Frequency: 1x week  Plan of Care beginning date: 2021  Plan of Care expiration date: 2021  Treatment plan discussed with: patient        Subjective Evaluation    History of Present Illness  Date of onset: 2020  Mechanism of injury: 2021: pt was tripped by his dog and injured his wrist  Pt was initially placed in a splint for 2 weeks, then a cast for 3 weeks, then transitioned to a brace d He continued w/ a sling for one week with the brace as well  He discontinued the sling 2 weeks ago  His to wean off the brace by the time he sees Dr Balderas Bile again  He feels pain in his wrist and hand, but also in his neck/shoulder and feels he cannot elevate his arm well away from his side  He experienced numbness and tingling in all fingers for the first 6 weeks, this has resolved  2021: Pt reports he cannot yet write, tie his shoes or pull his hair back w/ B/L hands yet, but can now zipper his jacket  He continues to feel there is also something wrong w/ his shoulder  He has constant paresthesias in R hand  Pain  Current pain rating: 3  At best pain rating: 3  At worst pain ratin  Pain location: R wrist, hand, elbow, shoulder  Quality: dull ache, sharp and burning  Relieving factors: rest  Exacerbated by: movement    Progression: improved    Social Support    Employment status: not working (heavy equpt  [employer]  Em [facility] due to injury)    Diagnostic Tests  X-ray: abnormal  Treatments  Previous treatment: immobilization  Current treatment: physical therapy  Patient Goals  Patient goals for therapy: decreased pain and increased motion  Patient goal: RTW by 3/15/2021 heavy equpt         Objective    Objective:   AROM: standing  R  R  L      2/16/2021 1/20/2021 1/20/2021  Shoulder flexion  115a*/140P 105*A/110P 160  Shoulder abduction  85A/110P 85*  160  Shoulder [email or web address]  70  45*  90  Shoulder IR   SI jt*  sIjt*  T10  Elbow flexion   113*  105*  140  Elbow exten   -13*  -37*  +1  Supination   0*  -40*  70  Pronation   90  65  85  Wrist exten   35*  10*  80  Wrist flex   15*  5*  55  Radial deviation  15*  8*  28  unlar deviation   10*  -4*  38    Opposition: 2/16/2021 - L thumb to 1st 2 fingers only  Fist AROM is approx 40%   1/20/2021 - L index to thumb only, cannot oppose other fingers  He cannot perform tendon glides  Fist AROM is 25%  STRENGTH:    R  R  L      2/16/2021 1/20/2021 1/20/2021    Shoulder flexion  3/5*  3/5*  5/5  Shoulder abduction  3-/5*  3-/5*  5/5  Shoulder [email or web address]  3+/5*  3/5*  5/5  Elbow flexion   3+/5*  3/5  5/5  Elbow exten   3+/5  3/5*  5/5  Supination   3/5  2+/5*  5/5  pronatation   3+/5  3/5*  5/5  Wrist exten   3+/5  3-/5*  5/5  Wrist flex   3+/5  3-/5  5/5  RD    3+/5  2+/5*  5/5  UD    3+/5  2+/5  5/5   pounds   NT  NT  115#    Edema: moderate through out R fingers/hand/forearm/elbow    Right  Left    2/16/2021 1/20/2021  Circum met heads 24 0 23 3  Circum RC jt  21 5  22 3cm 20    Joint mobility: 2/16/2021 - remains poor R RC jt, distalRU jt and [address] jt   1/20/2021- is poor RC, distal RU jt, elbow and GH joint      Mechan assessment cspine: 2/16/2021 (R hand paresthesias; limited shoulder AROM)  Rep retraction 1x10 =  NE  Rep retr w/ OP x =    NE  Rep cerv exten  NE       Precautions: ROM/stretching only  SOC: 1/20/2021  FOTO: 2/3/2021  RE: 2/16/2021  POC expiration: 4/14/2021  POC Daily Treatment Log:  Date 2/10//2021 2/16/2021      Visit # 6 7      Manual        Gentle dist to prox STM & 1st web space 5'  5'      Gentle PROM wrist flex/ext; supin/pron; elbow flex/ext Sit and 10' supine Sit and supine 10'      intermet glides        There Exer  35'       opposition  1x5 each      Wrist flex/ext  2x10 on ramp 2" 1x10 each      Wrist flex/ext w/ self OP 1x10 w/ man OP 10"x5 self      AROM to fist 2x5 w/ man OP place & hold 2x5 w/ man OP place & hold      AROM RD/UD on ramp  2"x10 each      Supination/pronation stand AROM 1x10; w  Self OP stand 2x10  Self OP 5"x10; AROM 5"x10      Supine tricep exten 1# cw 2x10 1# cw 2x10      Supine shld flexion AAROM OH W/ cane 2x10  AROM 5"x10      SL shld abd  AROM 5"x10      Sup static elbow exten stretch w/ cuff wgt 2# 1'x2  1# w/ AROM fist 5"x20      MCP flexion AROM 2x5 w/ man OP        AAROM elbow flexion stand AROM 2x10  W/ self OP flx/ext 5"x10; AROM 5"x10      Blocking thumb IP & and finger PIP's  1x10 each       towel roll 2x10        Wrist ext str palm on table 5"x10  5"x10      HEP  updated      NMRed                                                                                Modalities        MH to start forearm/hand 6' pre session; skin intact pre and post  5' to start; skin intact pre/post      CP to end                  Access Code: [email or web address]   URL: [email or web address]  [email or web address]   Date: 02/16/2021   Prepared by:  Denia Torres     Exercises  Seated Finger MP Flexion AROM and Wrist Extension - 15 reps - 1 sets - 5 hold - 2x daily - 7x weekly  Supine Elbow Flexion Extension AROM - 10 reps - 2 sets - 2x daily                            - 7x weekly  Seated Wrist Flexion with Overpressure - 10 reps - 1 sets - 2 hold - 2x daily - 7x weekly  Seated Wrist Extension PROM - 10 reps - 1 sets - 5 hold - 2x daily - 7x weekly  Forearm Supination Stretch - 10 reps - 1 sets - 5 hold - 2x daily - 7x weekly  Elbow Flexion PROM - 15 reps - 1 sets - 5 hold - 2x daily - 7x weekly  Towel Roll  on Table - 10 reps - 1 sets - 5 hold - 2x daily - 7x weekly  Supine Shoulder Flexion Extension Full Range AROM - 15 reps - 1 sets - 5 hold - 1x daily - 7x weekly  Hand AROM PIP Blocking - 15 reps - 1 sets - 1x daily - 7x weekly  Sidelying Shoulder Abduction Full Range of Motion - 15 reps - 1 sets - 5 hold - 1x daily - 7x weekly  Hand Place & Hold Flat Fist - 10 reps - 1 sets - 5 hold - 1x daily - 7x weekly

## 2021-02-17 ENCOUNTER — OFFICE VISIT (OUTPATIENT)
Dept: OBGYN CLINIC | Facility: CLINIC | Age: 56
End: 2021-02-17

## 2021-02-17 ENCOUNTER — APPOINTMENT (OUTPATIENT)
Dept: RADIOLOGY | Facility: CLINIC | Age: 56
End: 2021-02-17
Payer: COMMERCIAL

## 2021-02-17 ENCOUNTER — APPOINTMENT (OUTPATIENT)
Dept: PHYSICAL THERAPY | Facility: CLINIC | Age: 56
End: 2021-02-17
Payer: COMMERCIAL

## 2021-02-17 VITALS
BODY MASS INDEX: 36.6 KG/M2 | WEIGHT: 310 LBS | HEIGHT: 77 IN | SYSTOLIC BLOOD PRESSURE: 149 MMHG | HEART RATE: 79 BPM | DIASTOLIC BLOOD PRESSURE: 80 MMHG

## 2021-02-17 DIAGNOSIS — S52.221G CLOSED DISPLACED TRANSVERSE FRACTURE OF SHAFT OF RIGHT ULNA WITH DELAYED HEALING, SUBSEQUENT ENCOUNTER: Primary | ICD-10-CM

## 2021-02-17 DIAGNOSIS — M25.531 PAIN IN RIGHT WRIST: ICD-10-CM

## 2021-02-17 DIAGNOSIS — S52.221G CLOSED DISPLACED TRANSVERSE FRACTURE OF SHAFT OF RIGHT ULNA WITH DELAYED HEALING, SUBSEQUENT ENCOUNTER: ICD-10-CM

## 2021-02-17 PROCEDURE — 73090 X-RAY EXAM OF FOREARM: CPT

## 2021-02-17 PROCEDURE — 99024 POSTOP FOLLOW-UP VISIT: CPT | Performed by: ORTHOPAEDIC SURGERY

## 2021-02-17 NOTE — PROGRESS NOTES
Assessment/Plan:  1  Closed displaced transverse fracture of shaft of right ulna with delayed healing, subsequent encounter  XR forearm 2 vw right   2  Pain in right wrist  MRI wrist right wo contrast       Charlie Late has a stable healing fracture in his ulna which does not appear to be very tender on examination today  I am more concerned of his wrist and lack of progress with any motion and significant pain involving supination or pronation of the wrist   His pain seems to localize over the ulnar aspect and there could be an element of a TFCC injury which occurred with his original fall  I have ordered an MRI of the right wrist for further evaluation going forward  I recommend that he follow-up with Dr Jolly Huitron for the wrist after the MRI is complete  Subjective:   Karl Marquez  is a 54 y o  male who presents   To the office for follow-up for a right ulnar shaft fracture  He is 11 weeks out from his initial injury and had some delayed healing after being in [employer] cast initially  At last office visit he had significantly decreased range of motion of his wrist and swelling  We advised him to begin physical therapy for range of motion of his right wrist without any continued immobilization  He states his pain in his fracture site is improved but he still has lot of discomfort in the wrist and no range of motion in the wrist   He denies any new injury  He has not been able to return to work  He states he has a lot of discomfort over the ulnar aspect of his right wrist and no ability to supinate or pronate his wrist       Review of Systems   Constitutional: Negative for chills, fever and unexpected weight change  HENT: Negative for hearing loss, nosebleeds and sore throat  Eyes: Negative for pain, redness and visual disturbance  Respiratory: Negative for cough, shortness of breath and wheezing  Cardiovascular: Negative for chest pain, palpitations and leg swelling     Gastrointestinal: Negative for abdominal pain, nausea and vomiting  Endocrine: Negative for polyphagia and polyuria  Genitourinary: Negative for dysuria and hematuria  Musculoskeletal:        See HPI   Skin: Negative for rash and wound  Neurological: Negative for dizziness, numbness and headaches  Psychiatric/Behavioral: Negative for decreased concentration and suicidal ideas  The patient is not nervous/anxious            Past Medical History:   Diagnosis Date    Asthma     Dental disorder     Last Assessed: 11/21/2015    Hypertension        Past Surgical History:   Procedure Laterality Date    NO PAST SURGERIES         Family History   Problem Relation Age of Onset    Coronary artery disease Mother     No Known Problems Father        Social History     Occupational History    Occupation: [employer]   Tobacco Use    Smoking status: Former Smoker    Smokeless tobacco: Never Used   Substance and Sexual Activity    Alcohol use: Yes     Frequency: 2-3 times a week     Drinks per session: 5 or 6     Binge frequency: Weekly     Comment: 6 pack on weekends     Drug use: Never    Sexual activity: Not on file         Current Outpatient Medications:     albuterol (ProAir HFA) 90 mcg/act inhaler, Inhale 2 puffs every 4 (four) hours as needed for wheezing, Disp: 3 Inhaler, Rfl: 3    amLODIPine (NORVASC) 5 mg tablet, TAKE 1 TABLET DAILY, Disp: 90 tablet, Rfl: 3    hydrochlorothiazide (HYDRODIURIL) 12 5 mg tablet, TAKE 1 TABLET DAILY, Disp: 90 tablet, Rfl: 3    losartan (COZAAR) 100 MG tablet, TAKE 1 TABLET DAILY, Disp: 90 tablet, Rfl: 3    mometasone-formoterol (Dulera) 200-5 MCG/ACT inhaler, Inhale 2 puffs 2 (two) times a day, Disp: 3 Inhaler, Rfl: 3    No Known Allergies    Objective:  Vitals:    02/17/21 1114   BP: 149/80   Pulse: 79       Right Hand Exam     Tenderness   Right hand tenderness location:  tenderness to palpation over dorsum of right wrist most significant over ulnar aspect of right wrist at level of TFCC and ulnar styloid  Range of Motion   Wrist   Extension:  15 abnormal   Flexion:  20 abnormal   Pronation:  10 abnormal   Supination:  10 abnormal     Muscle Strength   Wrist extension: 4/5   Wrist flexion: 4/5   : 4/5     Other   Erythema: present  Sensation: normal  Pulse: present    Comments:   Mild swelling of right wrist compared to left      Right Elbow Exam     Tenderness   Right elbow tenderness location:   No significant tenderness to palpation over ulnar shaft  at level of fracture site  Range of Motion   Extension: normal   Flexion: normal           Strength/Myotome Testing     Right Wrist/Hand   Wrist extension: 4  Wrist flexion: 4      Physical Exam  Vitals signs reviewed  Constitutional:       Appearance: He is well-developed  HENT:      Head: Normocephalic and atraumatic  Eyes:      Conjunctiva/sclera: Conjunctivae normal       Pupils: Pupils are equal, round, and reactive to light  Neck:      Musculoskeletal: Normal range of motion and neck supple  Cardiovascular:      Rate and Rhythm: Normal rate  Pulmonary:      Effort: Pulmonary effort is normal  No respiratory distress  Musculoskeletal:      Comments: As noted in HPI   Skin:     General: Skin is warm and dry  Neurological:      Mental Status: He is alert and oriented to person, place, and time  Psychiatric:         Behavior: Behavior normal          I have personally reviewed pertinent films in PACS and my interpretation is as follows:   three-view x-rays of the right ulnar shaft the office today demonstrates stable, healing ulnar shaft fracture

## 2021-02-22 ENCOUNTER — APPOINTMENT (OUTPATIENT)
Dept: PHYSICAL THERAPY | Facility: CLINIC | Age: 56
End: 2021-02-22
Payer: COMMERCIAL

## 2021-02-24 ENCOUNTER — APPOINTMENT (OUTPATIENT)
Dept: PHYSICAL THERAPY | Facility: CLINIC | Age: 56
End: 2021-02-24
Payer: COMMERCIAL

## 2021-03-03 ENCOUNTER — HOSPITAL ENCOUNTER (OUTPATIENT)
Dept: RADIOLOGY | Facility: HOSPITAL | Age: 56
Discharge: HOME | End: 2021-03-03
Attending: ORTHOPAEDIC SURGERY
Payer: COMMERCIAL

## 2021-03-03 DIAGNOSIS — M25.531 PAIN IN RIGHT WRIST: ICD-10-CM

## 2021-03-03 PROCEDURE — 73221 MRI JOINT UPR EXTREM W/O DYE: CPT

## 2021-03-03 PROCEDURE — G1004 CDSM NDSC: HCPCS

## 2021-03-08 ENCOUNTER — OFFICE VISIT (OUTPATIENT)
Dept: OBGYN CLINIC | Facility: CLINIC | Age: 56
End: 2021-03-08
Payer: COMMERCIAL

## 2021-03-08 VITALS
WEIGHT: 315 LBS | SYSTOLIC BLOOD PRESSURE: 144 MMHG | HEART RATE: 84 BPM | BODY MASS INDEX: 37.19 KG/M2 | HEIGHT: 77 IN | DIASTOLIC BLOOD PRESSURE: 74 MMHG

## 2021-03-08 DIAGNOSIS — S52.221G CLOSED DISPLACED TRANSVERSE FRACTURE OF SHAFT OF RIGHT ULNA WITH DELAYED HEALING, SUBSEQUENT ENCOUNTER: ICD-10-CM

## 2021-03-08 DIAGNOSIS — S54.02XA NEURAPRAXIA OF LEFT ULNAR NERVE, INITIAL ENCOUNTER: Primary | ICD-10-CM

## 2021-03-08 PROCEDURE — 99213 OFFICE O/P EST LOW 20 MIN: CPT | Performed by: ORTHOPAEDIC SURGERY

## 2021-03-08 NOTE — PROGRESS NOTES
Assessment/Plan:  1  Neurapraxia of left ulnar nerve, initial encounter  Ambulatory referral to Pain Management    Ambulatory referral to PT/OT hand therapy   2  Closed displaced transverse fracture of shaft of right ulna with delayed healing, subsequent encounter  Ambulatory referral to PT/OT hand therapy    CANCELED: Ambulatory referral to Pain Management       Scribe Attestation    I,:  Hoa Montana MA am acting as a scribe while in the presence of the attending physician :       I,:  Tati Padilla DO personally performed the services described in this documentation    as scribed in my presence :             I discussed with Shlomo that the MRI was normal  He is minimally tender at the fracture site  X-rays demonstrate healing  I explained to him that I am concerned for a pain syndrome like RDS secondary to a nerve injury during the fall  A referral was provided to pain management for evaluation  Patient advised to continue with formal therapy for motion  I explained to him I would like him to see a hand therapist  A referral was provided to occupational therapy  He has no restrictions with the wrist  He will follow up in 6 weeks for repeat evaluation  Subjective:   Blair Solitario  is a 54 y o  male who presents to the office today as a referral from my partner Dr Stephanie Mcginnis  for follow up evaluation now 15 weeks s/p closed treatment for a rightt ulnar shaft fracture sustained on 11/20/21  Patient notes continued pain to the ulnar aspect of his wrist  He states this is a burning type of pain  He notes decreased motion with supination and pronation  Patient states the pain related to the fracture has improved  He notes numbness and tingling about the hand  Patient has been attending therapy  He is unable to make a fist  An MRI was ordered by Dr Stephanie Mcginnis at his last visit to evaluate for a TFCC tear  Patient was unable to tolerate the the MRI well due to shoulder and elbow pain         Review of Systems Constitutional: Negative for chills and fever  HENT: Negative for drooling and sneezing  Eyes: Negative for redness  Respiratory: Negative for cough and wheezing  Gastrointestinal: Negative for nausea and vomiting  Musculoskeletal: Negative for arthralgias, joint swelling and myalgias  Neurological: Negative for weakness and numbness  Psychiatric/Behavioral: Negative for behavioral problems  The patient is not nervous/anxious            Past Medical History:   Diagnosis Date    Asthma     Dental disorder     Last Assessed: 11/21/2015    Hypertension        Past Surgical History:   Procedure Laterality Date    NO PAST SURGERIES         Family History   Problem Relation Age of Onset    Coronary artery disease Mother     No Known Problems Father        Social History     Occupational History    Occupation: [employer]   Tobacco Use    Smoking status: Former Smoker    Smokeless tobacco: Never Used   Substance and Sexual Activity    Alcohol use: Yes     Frequency: 2-3 times a week     Drinks per session: 5 or 6     Binge frequency: Weekly     Comment: 6 pack on weekends     Drug use: Never    Sexual activity: Not on file         Current Outpatient Medications:     albuterol (ProAir HFA) 90 mcg/act inhaler, Inhale 2 puffs every 4 (four) hours as needed for wheezing, Disp: 3 Inhaler, Rfl: 3    amLODIPine (NORVASC) 5 mg tablet, TAKE 1 TABLET DAILY, Disp: 90 tablet, Rfl: 3    hydrochlorothiazide (HYDRODIURIL) 12 5 mg tablet, TAKE 1 TABLET DAILY, Disp: 90 tablet, Rfl: 3    losartan (COZAAR) 100 MG tablet, TAKE 1 TABLET DAILY, Disp: 90 tablet, Rfl: 3    mometasone-formoterol (Dulera) 200-5 MCG/ACT inhaler, Inhale 2 puffs 2 (two) times a day, Disp: 3 Inhaler, Rfl: 3    No Known Allergies    Objective:  Vitals:    03/08/21 1231   BP: 144/74   Pulse: 84       Ortho Exam     Right wrist    - tinel's over elbow  Ext 15  Flex 25  Mild TTP fx site   Unable to gerardo full fist   Compartments soft  Brisk capillary refill  S/m intact median, radial, and ulnar nerve     Physical Exam  Constitutional:       Appearance: He is well-developed  HENT:      Head: Normocephalic and atraumatic  Eyes:      General:         Right eye: No discharge  Left eye: No discharge  Conjunctiva/sclera: Conjunctivae normal    Neck:      Musculoskeletal: Normal range of motion and neck supple  Cardiovascular:      Rate and Rhythm: Normal rate  Pulmonary:      Effort: Pulmonary effort is normal  No respiratory distress  Musculoskeletal:      Comments: As noted in HPI   Skin:     General: Skin is warm and dry  Neurological:      Mental Status: He is alert and oriented to person, place, and time  Psychiatric:         Behavior: Behavior normal          Thought Content: Thought content normal          Judgment: Judgment normal          I have personally reviewed pertinent films in PACS and my interpretation is as follows:MRI right wrist performed on 3/3/21 demonstrates normal MRI

## 2021-03-11 ENCOUNTER — TELEPHONE (OUTPATIENT)
Dept: OBGYN CLINIC | Facility: HOSPITAL | Age: 56
End: 2021-03-11

## 2021-03-11 NOTE — TELEPHONE ENCOUNTER
Spoke to patient, he will see Artur Gilbert tomorrow and then return to see Alexsandra Du on 3/18/2021       He is also providing me with a new form to fill out , we will keep him out until 3- which is the day after he sees Alexsandra Du

## 2021-03-11 NOTE — TELEPHONE ENCOUNTER
Patient is calling back stating that Shankar Garza is telling him that he should have returned to work on 3/9 and he was unable to because he could not use his arm  He is not sure if there was a mistake on the paperwork or if the disability company is wrong  Patient would like a call back

## 2021-03-11 NOTE — TELEPHONE ENCOUNTER
Patient is calling stating that [employer] faxed disability paper work to us & he would like to know when that will be completed  Patient is not getting paid until this form is returned  Patient states that he cannot return to work with the way that his hand is at this time       [phone]

## 2021-03-11 NOTE — TELEPHONE ENCOUNTER
Paper work was completed for patient on 03/09 and faxed into [facility]  Patient states he sw [facility] this morning who told him they would be faxing over NEW forms to be completed  Will await paper work to see if it is the same form we completed already or if it is different  I will re-fax the paperwork we completed earlier this week as well

## 2021-03-12 ENCOUNTER — CONSULT (OUTPATIENT)
Dept: PAIN MEDICINE | Facility: CLINIC | Age: 56
End: 2021-03-12
Payer: COMMERCIAL

## 2021-03-12 VITALS
WEIGHT: 315 LBS | DIASTOLIC BLOOD PRESSURE: 69 MMHG | HEIGHT: 77 IN | BODY MASS INDEX: 37.19 KG/M2 | HEART RATE: 83 BPM | SYSTOLIC BLOOD PRESSURE: 122 MMHG

## 2021-03-12 DIAGNOSIS — M25.511 RIGHT SHOULDER PAIN, UNSPECIFIED CHRONICITY: ICD-10-CM

## 2021-03-12 DIAGNOSIS — G56.91 NEUROPATHY OF RIGHT UPPER EXTREMITY: ICD-10-CM

## 2021-03-12 DIAGNOSIS — G89.4 CHRONIC PAIN SYNDROME: Primary | ICD-10-CM

## 2021-03-12 PROCEDURE — 99204 OFFICE O/P NEW MOD 45 MIN: CPT | Performed by: ANESTHESIOLOGY

## 2021-03-12 NOTE — PROGRESS NOTES
Assessment:  1  Chronic pain syndrome    2  Right shoulder pain, unspecified chronicity    3  Neuropathy of right upper extremity        Plan:  My impressions and treatment recommendations were discussed in detail with the patient, who verbalized understanding and had no further questions  The patient was evaluated by myself for possible complex regional pain syndrome of the right upper extremity  He does not exhibit any signs of complex regional pain syndrome  His symptoms are more consistent with a right upper extremity peripheral neuropathy  He is only complaining of numbness and tingling of his right hand as well as mild edema  He reports no skin, hair, or nail changes  He  Has no hyperesthesia or allodynia  There is no increased diaphoresis  There is mild edema of the right hand as compared to the left  There is no temperature intolerance of the right hand  I did offer the patient a EMG/ NCV study of the right upper extremity to determine what type of peripheral neuropathy he has  The patient states that his symptoms are slowly resolving and he would prefer to continue follow-up with Dr Komal Hutchins  Follow-up is planned in on an as-needed basis  Discharge instructions were provided  I personally saw and examined the patient and I agree with the above discussed plan of care  History of Present Illness:    Troy Newman  is a 54 y o  male who presents to [facility] for initial evaluation of the above stated pain complaints  The patient has a past medical and chronic pain history as outlined in the assessment section  He was referred by Dr Komal Hutchins  The patient reports pain in his right forearm and hand as well as his right shoulder  He describes his pain occurring after an accident on November 20, 2020  He is sent over here for consideration of complex regional pain syndrome of the right upper extremity    He states that his pain is moderate to severe and 6 to 7/10 on the verbal numerical pain rating scale  His pain is constant in nature  He states that his pain is worse in the evening and night, but has no typical pattern  He describes his pain as burning, cramping, shooting, numbness, sharp, cutting, pins and needles, pressure like, throbbing, and dull/aching  He reports weakness in his upper extremities  He does not ambulate with any assistive devices  Exercise increases pain  There are no pain relieving factors  The patient does drink alcohol on the weekends  Review of Systems:    Review of Systems   Constitutional: Negative for fever and unexpected weight change  HENT: Negative for trouble swallowing  Eyes: Negative for visual disturbance  Respiratory: Negative for shortness of breath and wheezing  Cardiovascular: Negative for chest pain and palpitations  Gastrointestinal: Negative for constipation, diarrhea, nausea and vomiting  Endocrine: Negative for cold intolerance, heat intolerance and polydipsia  Genitourinary: Negative for difficulty urinating and frequency  Musculoskeletal: Positive for joint swelling  Negative for arthralgias, gait problem and myalgias  Joint Pain   Muscle Pain   Skin: Negative for rash  Neurological: Negative for dizziness, seizures, syncope, weakness and headaches  Hematological: Does not bruise/bleed easily  Psychiatric/Behavioral: Negative for dysphoric mood  All other systems reviewed and are negative          Patient Active Problem List   Diagnosis    Asthma    Benign essential hypertension    CKD (chronic kidney disease) stage 1, GFR 90 ml/min or greater    Class 2 obesity due to excess calories without serious comorbidity with body mass index (BMI) of 37 0 to 37 9 in adult    Chronic pain syndrome    Right shoulder pain    Neuropathy of right upper extremity       Past Medical History:   Diagnosis Date    Asthma     Dental disorder     Last Assessed: 11/21/2015    Hypertension        Past Surgical History:   Procedure Laterality Date    NO PAST SURGERIES         Family History   Problem Relation Age of Onset    Coronary artery disease Mother     No Known Problems Father        Social History     Occupational History    Occupation: [employer]   Tobacco Use    Smoking status: Former Smoker    Smokeless tobacco: Never Used   Substance and Sexual Activity    Alcohol use: Yes     Frequency: 2-3 times a week     Drinks per session: 5 or 6     Binge frequency: Weekly     Comment: 6 pack on weekends     Drug use: Never    Sexual activity: Not on file         Current Outpatient Medications:     albuterol (ProAir HFA) 90 mcg/act inhaler, Inhale 2 puffs every 4 (four) hours as needed for wheezing, Disp: 3 Inhaler, Rfl: 3    amLODIPine (NORVASC) 5 mg tablet, TAKE 1 TABLET DAILY, Disp: 90 tablet, Rfl: 3    hydrochlorothiazide (HYDRODIURIL) 12 5 mg tablet, TAKE 1 TABLET DAILY, Disp: 90 tablet, Rfl: 3    losartan (COZAAR) 100 MG tablet, TAKE 1 TABLET DAILY, Disp: 90 tablet, Rfl: 3    mometasone-formoterol (Dulera) 200-5 MCG/ACT inhaler, Inhale 2 puffs 2 (two) times a day, Disp: 3 Inhaler, Rfl: 3    No Known Allergies    Physical Exam:    /69   Pulse 83   Ht 6' 5" (1 956 m)   Wt (!) 144 kg (318 lb)   BMI 37 71 kg/m²     Constitutional: obese  Eyes: anicteric  HEENT: grossly intact  Neck: supple, symmetric, trachea midline and no masses   Pulmonary:even and unlabored  Cardiovascular:No edema or pitting edema present  Skin:Normal without rashes or lesions and well hydrated  Psychiatric:Mood and affect appropriate  Neurologic:Cranial Nerves II-XII grossly intact  Musculoskeletal:normal , muscle strength is 5/5 in all major proximal distal muscle groups of the bilateral upper extremities  Sensation is grossly intact in all major dermatomes of the bilateral upper extremities  There is mild edema of the right hand as compared to the left    There is mild rubor of the right hand as compared to the left  There is no allodynia or hyperesthesia noted  Pinwheel testing does not cause increased pain of the right upper extremity as compared to the left  The patient does have numbness and tingling of the bilateral upper extremities on all sensation testing , however his perception of sensation is entirely intact  There are no skin, hair, or nail changes noted of the right hand

## 2021-03-16 ENCOUNTER — EVALUATION (OUTPATIENT)
Dept: OCCUPATIONAL THERAPY | Facility: CLINIC | Age: 56
End: 2021-03-16
Payer: COMMERCIAL

## 2021-03-16 DIAGNOSIS — S52.221G CLOSED DISPLACED TRANSVERSE FRACTURE OF SHAFT OF RIGHT ULNA WITH DELAYED HEALING, SUBSEQUENT ENCOUNTER: ICD-10-CM

## 2021-03-16 DIAGNOSIS — S54.02XA NEURAPRAXIA OF LEFT ULNAR NERVE, INITIAL ENCOUNTER: ICD-10-CM

## 2021-03-16 PROCEDURE — 97166 OT EVAL MOD COMPLEX 45 MIN: CPT

## 2021-03-16 PROCEDURE — 97110 THERAPEUTIC EXERCISES: CPT

## 2021-03-16 NOTE — PROGRESS NOTES
OT Evaluation     Today's date: 3/16/2021  Patient name: Melissa Mayorga  : 1965  MRN: [id number]  Referring provider: Gabriella Rolle DO  Dx:   Encounter Diagnosis     ICD-10-CM    1  Closed displaced transverse fracture of shaft of right ulna with delayed healing, subsequent encounter  S52 221G Ambulatory referral to PT/OT hand therapy   2  Neurapraxia of left ulnar nerve, initial encounter  S54  02XA Ambulatory referral to PT/OT hand therapy       Start Time:   Stop Time: 144  Total time in clinic (min): 46 minutes    Assessment  Assessment details: Patient reported that he was walking his dog and he hit a concrete step with his arm and broke his ulna  Patient reported that he ws in a cast and splint for a total of 9 weeks  "I can't make a fist, I can't turn my hand " "I'm always numb and tingling  All the time, constantly  Pretty much from the break up " "I can't tie my pony-tail in my hair  Pretty much everything in my right hand I can't do too much  I just started writing with it  It's sloppy, but I'm starting to " Patient reported that it is painful to open doorknobs  Patient reported being independent with ADLs  "I'm up all night, I can't sleep  I can't get comfortable "     Melissa Mayorga  is a 54 y o  male who presents with Closed displaced transverse fracture of shaft of right ulna with delayed healing and Neurapraxia of left ulnar nerve  Patient tolerated session well  Carrolyn Host reported difficulty with activities of daily living secondary to decreased range of motion, decreased strength, and pain with function  Provided home exercise program for upper extremity range of motion and ulnar nerve glides  Patient was able to demonstrate home program past instruction with use of handouts  Patient advised to contact doctor if there is a change of status  Patient advised to discontinue any exercise which cause increased pain   Patient is a good candidate to benefit from skilled occupational therapy to address impairments and return to maximal level of function with minimal symptoms      Impairments: abnormal or restricted ROM, activity intolerance, impaired physical strength, lacks appropriate home exercise program, pain with function and weight-bearing intolerance    Symptom irritability: highUnderstanding of Dx/Px/POC: good   Prognosis: good    Goals  Short term goals:  Patient to demonstrate understanding of home exercise program in 2 weeks for decreased pain with activities of daily living  Patient to demonstrate increased active range of motion of wrist flexion/extension to 30/50 degrees in 4 weeks to aid in showering  Patient to increase composite digital flexion to touch distal samuel crease in 4 weeks to aid in holding utensils   Patient to demonstrate increased  strength to 20 lbs to aid in work tasks in 3 weeks  Patient to report a decrease in pain by at least 1 point on a 0-10 scale in 2 weeks to aid in dressing    Long term goals:  Patient to demonstrate functional active range of motion for independent ADL's by time of discharge  Patient to demonstrate functional strength for independent ADL's by time of discharge  Patient to increase FOTO score to 55 by time of discharge        Plan  Patient would benefit from: skilled occupational therapy  Planned modality interventions: thermotherapy: hydrocollator packs  Planned therapy interventions: joint mobilization, manual therapy, strengthening, stretching, therapeutic activities, therapeutic exercise, flexibility, functional ROM exercises, fine motor coordination training and home exercise program  Frequency: 2x week  Duration in weeks: 6  Plan of Care beginning date: 3/16/2021  Plan of Care expiration date: 4/27/2021  Treatment plan discussed with: patient        Subjective Evaluation    History of Present Illness  Date of onset: 11/20/2020  Mechanism of injury: trauma  Mechanism of injury: Patient reported that he was walking his dog and he hit a concrete step with his arm and broke his ulna  Patient reported that he ws in a cast and splint for a total of 9 weeks  "I can't make a fist, I can't turn my hand " "I'm always numb and tingling  All the time, constantly  Pretty much from the break up " "I can't tie my pony-tail in my hair  Pretty much everything in my right hand I can't do too much  I just started writing with it  It's sloppy, but I'm starting to " Patient reported that it is painful to open doorknobs  Patient reported being independent with ADLs  "I'm up all night, I can't sleep  I can't get comfortable "    Quality of life: good    Pain  Current pain ratin  At best pain ratin  At worst pain ratin  Location: right hand, wrist, elbow, shoulder  Quality: sharp, throbbing and dull ache  Aggravating factors: lifting    Social Support    Employment status: not working ([employer]    Hobbies: motorcycle riding)  Hand dominance: right    Patient Goals  Patient goals for therapy: return to work, decreased pain, increased motion, increased strength and return to sport/leisure activities  Patient goal: I'd like to get my hand moving  Objective     Observations     Right Wrist/Hand   Positive for edema       Neurological Testing     Sensation     Wrist/Hand   Left   Intact: light touch    Right   Diminished: light touch    Comments   Right light touch: [place]: 3 61 thumb, 4 31 digits 2-5    Active Range of Motion   Left Shoulder   Flexion: 160 degrees     Right Shoulder   Flexion: 115 degrees     Left Elbow   Flexion: 135 degrees   Extension: 2 degrees   Forearm supination: 71 degrees   Forearm pronation: 72 degrees     Right Elbow   Flexion: 133 degrees   Extension: -18 degrees   Forearm supination: 0 degrees   Forearm pronation: 70 degrees     Left Wrist   Wrist flexion: 45 degrees   Wrist extension: 71 degrees   Radial deviation: 15 degrees   Ulnar deviation: 36 degrees     Right Wrist   Wrist flexion: 11 degrees   Wrist extension: 30 degrees   Radial deviation: 2 degrees   Ulnar deviation: 17 degrees     Additional Active Range of Motion Details  Distance to distal palmar crease:  Right Thumb: 5 cm  Right Index: 6 cm  Right Middle: 7 cm  Right Rin cm  Right Small: 5 5 cm    Strength/Myotome Testing     Left Wrist/Hand      (2nd hand position)     Trial 1: 128    Thumb Strength  Key/Lateral Pinch     Trial 1: 18  Palmar/Three-Point Pinch     Trial 1: 22    Right Wrist/Hand      (2nd hand position)     Trial 1: 4    Thumb Strength   Key/Lateral Pinch     Trial 1: 10  Palmar/Three-Point Pinch     Trial 1: 6    Swelling     Left Wrist/Hand   Circumference MCP: 23 4 cm  Circumference wrist: 18 5 cm    Right Wrist/Hand   Circumference MCP: 23 9 cm  Circumference wrist: 20 5 cm      Flowsheet Rows      Most Recent Value   PT/OT G-Codes   Current Score  33   Projected Score  55             Precautions: Universal     Manuals HEP                        Ther Ex     Tendon glides x10    Digit ext/abd x10    BROM digits x10    AROM wrist, forearm, elbow x10    Supine shoulder wand flex x10    Table shoulder slides x10    Wrist flexor/extensor stretch 10 sec x5    Prayer stretch 10 sec x5         Ther Activity                                   Modalities

## 2021-03-18 ENCOUNTER — APPOINTMENT (OUTPATIENT)
Dept: RADIOLOGY | Facility: CLINIC | Age: 56
End: 2021-03-18
Payer: COMMERCIAL

## 2021-03-18 ENCOUNTER — OFFICE VISIT (OUTPATIENT)
Dept: OBGYN CLINIC | Facility: CLINIC | Age: 56
End: 2021-03-18
Payer: COMMERCIAL

## 2021-03-18 VITALS
HEIGHT: 77 IN | WEIGHT: 315 LBS | SYSTOLIC BLOOD PRESSURE: 151 MMHG | BODY MASS INDEX: 37.19 KG/M2 | HEART RATE: 84 BPM | DIASTOLIC BLOOD PRESSURE: 82 MMHG

## 2021-03-18 DIAGNOSIS — S54.02XA NEURAPRAXIA OF LEFT ULNAR NERVE, INITIAL ENCOUNTER: ICD-10-CM

## 2021-03-18 DIAGNOSIS — M75.01 ADHESIVE CAPSULITIS OF RIGHT SHOULDER: ICD-10-CM

## 2021-03-18 DIAGNOSIS — M25.511 RIGHT SHOULDER PAIN, UNSPECIFIED CHRONICITY: ICD-10-CM

## 2021-03-18 DIAGNOSIS — S52.221G CLOSED DISPLACED TRANSVERSE FRACTURE OF SHAFT OF RIGHT ULNA WITH DELAYED HEALING, SUBSEQUENT ENCOUNTER: ICD-10-CM

## 2021-03-18 DIAGNOSIS — S52.221G CLOSED DISPLACED TRANSVERSE FRACTURE OF SHAFT OF RIGHT ULNA WITH DELAYED HEALING, SUBSEQUENT ENCOUNTER: Primary | ICD-10-CM

## 2021-03-18 PROCEDURE — 99213 OFFICE O/P EST LOW 20 MIN: CPT | Performed by: ORTHOPAEDIC SURGERY

## 2021-03-18 PROCEDURE — 3008F BODY MASS INDEX DOCD: CPT | Performed by: ORTHOPAEDIC SURGERY

## 2021-03-18 PROCEDURE — 73090 X-RAY EXAM OF FOREARM: CPT

## 2021-03-18 PROCEDURE — 73030 X-RAY EXAM OF SHOULDER: CPT

## 2021-03-18 PROCEDURE — 1036F TOBACCO NON-USER: CPT | Performed by: ORTHOPAEDIC SURGERY

## 2021-03-18 NOTE — PROGRESS NOTES
Assessment/Plan:  1  Closed displaced transverse fracture of shaft of right ulna with delayed healing, subsequent encounter  XR forearm 2 vw right   2  Neurapraxia of left ulnar nerve, initial encounter     3  Right shoulder pain, unspecified chronicity  XR shoulder 2+ vw right   4  Adhesive capsulitis of right shoulder         Scribe Attestation    I,:  Hoa Montana MA am acting as a scribe while in the presence of the attending physician :       I,:  Luz Berkowitz DO personally performed the services described in this documentation    as scribed in my presence :              I discussed with Leah Antonio today that I am unsure why he is having so much stiffness and pain  X-rays demonstrate good healing  He is relatively non tender at the fracture site  We did discuss a possible second opinion  He would hold off on this at this time  He will continue with occupational therapy for motion and strengthening  He has no restrictions at this time  He will remain out of work  He will follow up in 4 weeks for repeat evaluation  Subjective:   Cristhian Torres  is a 54 y o  male who presents to the office today for follow up evaluation of his right upper extremity  Patient has been treated conservative for a right ulnar shaft fracture sustained on 11/20/21  Patient notes continued pain and stiffness about his wrist and fingers  He is unable to make a fist  He notes numbness and tingling about the arm  Patient has just started occupational therapy  Patient was evaluated by pain management Dr Matt Charles for possible RSD  Dr Matt Charles did not think it was RSD and thought it was a neuropathy  Review of Systems   Constitutional: Negative for chills and fever  HENT: Negative for drooling and sneezing  Eyes: Negative for redness  Respiratory: Negative for cough and wheezing  Gastrointestinal: Negative for nausea and vomiting  Musculoskeletal: Negative for arthralgias, joint swelling and myalgias     Neurological: Negative for weakness and numbness  Psychiatric/Behavioral: Negative for behavioral problems  The patient is not nervous/anxious  Past Medical History:   Diagnosis Date    Asthma     Dental disorder     Last Assessed: 11/21/2015    Hypertension        Past Surgical History:   Procedure Laterality Date    NO PAST SURGERIES         Family History   Problem Relation Age of Onset    Coronary artery disease Mother     No Known Problems Father        Social History     Occupational History    Occupation: [email or web address]   Tobacco Use    Smoking status: Former Smoker    Smokeless tobacco: Never Used   Substance and Sexual Activity    Alcohol use: Yes     Frequency: 2-3 times a week     Drinks per session: 5 or 6     Binge frequency: Weekly     Comment: 6 pack on weekends     Drug use: Never    Sexual activity: Not on file         Current Outpatient Medications:     albuterol (ProAir HFA) 90 mcg/act inhaler, Inhale 2 puffs every 4 (four) hours as needed for wheezing, Disp: 3 Inhaler, Rfl: 3    amLODIPine (NORVASC) 5 mg tablet, TAKE 1 TABLET DAILY, Disp: 90 tablet, Rfl: 3    hydrochlorothiazide (HYDRODIURIL) 12 5 mg tablet, TAKE 1 TABLET DAILY, Disp: 90 tablet, Rfl: 3    losartan (COZAAR) 100 MG tablet, TAKE 1 TABLET DAILY, Disp: 90 tablet, Rfl: 3    mometasone-formoterol (Dulera) 200-5 MCG/ACT inhaler, Inhale 2 puffs 2 (two) times a day, Disp: 3 Inhaler, Rfl: 3    No Known Allergies    Objective:  Vitals:    03/18/21 1304   BP: 151/82   Pulse: 84       Ortho Exam     Right wrist    Limited finger ROM  Full flex and ext elbow  Full pronation  30 supination  Relatively non tender fx site  fw flex and abduction 90 degrees   Compartments soft  Brisk capillary refill  S/m intact median, radial, and ulnar nerve     Physical Exam  Constitutional:       Appearance: He is well-developed  HENT:      Head: Normocephalic and atraumatic  Eyes:      General:         Right eye: No discharge           Left eye: No discharge  Conjunctiva/sclera: Conjunctivae normal    Neck:      Musculoskeletal: Normal range of motion and neck supple  Cardiovascular:      Rate and Rhythm: Normal rate  Pulmonary:      Effort: Pulmonary effort is normal  No respiratory distress  Musculoskeletal:      Comments: As noted in HPI   Skin:     General: Skin is warm and dry  Neurological:      Mental Status: He is alert and oriented to person, place, and time  Psychiatric:         Behavior: Behavior normal          Thought Content: Thought content normal          Judgment: Judgment normal          I have personally reviewed pertinent films in PACS and my interpretation is as follows:x-ray right forearm performed in the office today demonstrates good fracture healing  X-ray right shoulder performed in the office today demonstrates no osseous abnormalities

## 2021-03-23 ENCOUNTER — OFFICE VISIT (OUTPATIENT)
Dept: OCCUPATIONAL THERAPY | Facility: CLINIC | Age: 56
End: 2021-03-23
Payer: COMMERCIAL

## 2021-03-23 DIAGNOSIS — S52.221G CLOSED DISPLACED TRANSVERSE FRACTURE OF SHAFT OF RIGHT ULNA WITH DELAYED HEALING, SUBSEQUENT ENCOUNTER: Primary | ICD-10-CM

## 2021-03-23 DIAGNOSIS — S54.02XA NEURAPRAXIA OF LEFT ULNAR NERVE, INITIAL ENCOUNTER: ICD-10-CM

## 2021-03-23 PROCEDURE — 97140 MANUAL THERAPY 1/> REGIONS: CPT

## 2021-03-23 PROCEDURE — 97110 THERAPEUTIC EXERCISES: CPT

## 2021-03-23 NOTE — PROGRESS NOTES
Daily Note     Today's date: 3/23/2021  Patient name: Elena Luis  : 1965  MRN: [id number]  Referring provider: Ligia Schroeder DO  Dx:   Encounter Diagnosis     ICD-10-CM    1  Closed displaced transverse fracture of shaft of right ulna with delayed healing, subsequent encounter  S52 221G    2  Neurapraxia of left ulnar nerve, initial encounter  S54  02XA        Start Time: 0158  Stop Time: 6957  Total time in clinic (min): 58 minutes    Subjective: "I can hold a water bottle now " Patient reported difficulty sleeping secondary to pain  He reported being able to hold a dumbbell  Objective: See treatment diary below       Precautions: Universal     Manuals HEP 3/23/2021   Grade 2-3 mobs  x35 mins   STM/Retrograde  x8 min             Ther Ex     Tendon glides x10    Digit ext/abd x10 x10   BROM digits x10    AROM wrist, forearm, elbow x10 x10   Supine shoulder wand flex x10    Table shoulder slides x10    Wrist flexor/extensor stretch 10 sec x5 10 sec x3   Prayer stretch 10 sec x5         Ther Activity                                   Modalities     Moist heat  x5 min             Assessment: Patient tolerated session well  Per doctor's note he has no restrictions at this time  Patient reported being compliant with home exercise program 3-5 times daily  He reported that his fingers will be stiff at the end of the day  "I notice a slight improvement, but if I don't do the exercises for a couple hours I go back to where I was " Significant stiffness noted with therapist assisted passive range of motion in digits, wrist, and forearm  Patient reported pain with passive motion  Patient educated on proper technique for passive range at home and demonstrated understanding  Patient continues with edema over dorsal hand and wrist  Patient educated on retrograde massage and instructed to perform as needed  Provided tubi- to be worn at night and during day as needed for edema reduction      Patient continues with severe range of motion deficits which are affecting his ability to complete ADLs, IADLs, and preventing him from returning to work  Patient reported that he cannot sleep at night due to pain  He noted that his job is denying him light duty and is requesting that he be able to do his normal job tasks prior to returning to work  Patient would benefit from continued skilled occupational therapy for increased range of motion, edema control, and strengthening needed for ADLs and return to work  Plan: Focus on range of motion and edema control to decrease dependence with ADLs  Begin strengthening once range of motion increases   POC: 3/16/2021 - 4/27/2021

## 2021-03-25 ENCOUNTER — OFFICE VISIT (OUTPATIENT)
Dept: OCCUPATIONAL THERAPY | Facility: CLINIC | Age: 56
End: 2021-03-25
Payer: COMMERCIAL

## 2021-03-25 DIAGNOSIS — S54.02XA NEURAPRAXIA OF LEFT ULNAR NERVE, INITIAL ENCOUNTER: ICD-10-CM

## 2021-03-25 DIAGNOSIS — S52.221G CLOSED DISPLACED TRANSVERSE FRACTURE OF SHAFT OF RIGHT ULNA WITH DELAYED HEALING, SUBSEQUENT ENCOUNTER: Primary | ICD-10-CM

## 2021-03-25 PROCEDURE — 97110 THERAPEUTIC EXERCISES: CPT

## 2021-03-25 PROCEDURE — 97140 MANUAL THERAPY 1/> REGIONS: CPT

## 2021-03-25 NOTE — PROGRESS NOTES
Daily Note     Today's date: 3/25/2021  Patient name: Tiki Burgos  : 1965  MRN: [id number]  Referring provider: Leonel Arellano DO  Dx:   Encounter Diagnosis     ICD-10-CM    1  Closed displaced transverse fracture of shaft of right ulna with delayed healing, subsequent encounter  S52 221G    2  Neurapraxia of left ulnar nerve, initial encounter  S54  02XA        Start Time: 1440  Stop Time: 1532  Total time in clinic (min): 52 minutes    Subjective: "I tried putting my hair up the other day, forget about it " Patient reported being able to tie his shoes  Objective: See treatment diary below       Precautions: Universal     Manuals HEP 3/23/2021   Grade 2-3 mobs  x30 mins   STM/Retrograde  x7 min             Ther Ex     Tendon glides x10    Digit ext/abd x10 x10   BROM digits x10    AROM wrist, forearm, elbow x10 x10   Supine shoulder wand flex x10    Table shoulder slides x10    Wrist flexor/extensor stretch 10 sec x5 10 sec x3   Prayer stretch 10 sec x5 10 sec x3   Juxtaciser  x5                  Modalities     Moist heat   x5 min             Assessment: Patient tolerated session well  Patient heated in supine with forearm in supination  Patient reported with decreased edema since last session  He reported being compliant with tubi- wearing  Significant stiffness noted with therapist assisted passive range of motion in digits, wrist, and forearm  Patient reported pain with passive motion  Provided tubi- to be worn at night and during day as needed for edema reduction  Patient continues with severe range of motion deficits which are affecting his ability to complete ADLs, IADLs, and preventing him from returning to work  Patient reported that he cannot sleep at night due to pain  He noted that his job is denying him light duty and is requesting that he be able to do his normal job tasks prior to returning to work   Patient would benefit from continued skilled occupational therapy for increased range of motion, edema control, and strengthening needed for ADLs and return to work  Plan: Focus on range of motion and edema control to decrease dependence with ADLs  Begin strengthening once range of motion increases   POC: 3/16/2021 - 4/27/2021

## 2021-03-30 ENCOUNTER — OFFICE VISIT (OUTPATIENT)
Dept: OCCUPATIONAL THERAPY | Facility: CLINIC | Age: 56
End: 2021-03-30
Payer: COMMERCIAL

## 2021-03-30 DIAGNOSIS — S54.02XA NEURAPRAXIA OF LEFT ULNAR NERVE, INITIAL ENCOUNTER: ICD-10-CM

## 2021-03-30 DIAGNOSIS — S52.221G CLOSED DISPLACED TRANSVERSE FRACTURE OF SHAFT OF RIGHT ULNA WITH DELAYED HEALING, SUBSEQUENT ENCOUNTER: Primary | ICD-10-CM

## 2021-03-30 DIAGNOSIS — J45.20 MILD INTERMITTENT ASTHMA WITHOUT COMPLICATION: ICD-10-CM

## 2021-03-30 PROCEDURE — 97110 THERAPEUTIC EXERCISES: CPT

## 2021-03-30 PROCEDURE — 97140 MANUAL THERAPY 1/> REGIONS: CPT

## 2021-03-30 RX ORDER — ALBUTEROL SULFATE 90 UG/1
AEROSOL, METERED RESPIRATORY (INHALATION)
Qty: 25.5 G | Refills: 3 | Status: SHIPPED | OUTPATIENT
Start: 2021-03-30 | End: 2021-08-02 | Stop reason: SDUPTHER

## 2021-03-30 RX ORDER — MOMETASONE FUROATE AND FORMOTEROL FUMARATE DIHYDRATE 200; 5 UG/1; UG/1
AEROSOL RESPIRATORY (INHALATION)
Qty: 39 G | Refills: 3 | Status: SHIPPED | OUTPATIENT
Start: 2021-03-30 | End: 2021-08-02 | Stop reason: SDUPTHER

## 2021-03-30 NOTE — PROGRESS NOTES
Daily Note     Today's date: 3/30/2021  Patient name: Viki Lanes  : 1965  MRN: [id number]  Referring provider: Ivett Munguia DO  Dx:   Encounter Diagnosis     ICD-10-CM    1  Closed displaced transverse fracture of shaft of right ulna with delayed healing, subsequent encounter  S52 221G    2  Neurapraxia of left ulnar nerve, initial encounter  S54  02XA        Start Time: 2604  Stop Time: 1533  Total time in clinic (min): 48 minutes    Subjective: "I think I irritated it yesterday working out " Patient reported that he believes his finger motion has improved since last session  "I can't turn a doorknob yet " "I actually buttoned a pair of pants "      Objective: See treatment diary below       Precautions: Universal     Manuals HEP 3/30/2021   Grade 2-3 mobs  x32 mins   STM/Retrograde  x3 min             Ther Ex     Tendon glides x10    Digit ext/abd x10 x10   BROM digits x10    AROM wrist, forearm, elbow x10 x10   Supine shoulder wand flex x10    Table shoulder slides x10    Wrist flexor/extensor stretch 10 sec x5 10 sec x3   Prayer stretch 10 sec x5 10 sec x3   Juxtaciser  x5   Towel scrunches  x5             Modalities     Moist heat   x5 min             Assessment: Patient tolerated session well  Session focused on range of motion and edema control  Significant stiffness noted with therapist assisted passive range of motion in digits, wrist, and forearm  Increased digit range of motion noted since last session  Increased wrist and digit range of motion post session  Patient tolerated new range of motion exercise without complaints of pain  Patient reported pain with passive motion  Patient benefiting from skilled occupational therapy to increase range of motion needed for ADLs and work tasks  Plan: Focus on range of motion and edema control to decrease dependence with ADLs  Begin strengthening once range of motion increases   POC: 3/16/2021 - 2021

## 2021-04-01 ENCOUNTER — APPOINTMENT (OUTPATIENT)
Dept: OCCUPATIONAL THERAPY | Facility: CLINIC | Age: 56
End: 2021-04-01
Payer: COMMERCIAL

## 2021-04-06 ENCOUNTER — OFFICE VISIT (OUTPATIENT)
Dept: OCCUPATIONAL THERAPY | Facility: CLINIC | Age: 56
End: 2021-04-06
Payer: COMMERCIAL

## 2021-04-06 DIAGNOSIS — S52.221G CLOSED DISPLACED TRANSVERSE FRACTURE OF SHAFT OF RIGHT ULNA WITH DELAYED HEALING, SUBSEQUENT ENCOUNTER: Primary | ICD-10-CM

## 2021-04-06 DIAGNOSIS — S54.02XA NEURAPRAXIA OF LEFT ULNAR NERVE, INITIAL ENCOUNTER: ICD-10-CM

## 2021-04-06 PROCEDURE — 97110 THERAPEUTIC EXERCISES: CPT

## 2021-04-06 PROCEDURE — 97140 MANUAL THERAPY 1/> REGIONS: CPT

## 2021-04-06 NOTE — PROGRESS NOTES
Daily Note     Today's date: 2021  Patient name: Can Resendiz  : 1965  MRN: [id number]  Referring provider: Hieu Tirado DO  Dx:   Encounter Diagnosis     ICD-10-CM    1  Closed displaced transverse fracture of shaft of right ulna with delayed healing, subsequent encounter  S52 221G    2  Neurapraxia of left ulnar nerve, initial encounter  S54  02XA        Start Time: 8355  Stop Time: 1231  Total time in clinic (min): 47 minutes    Subjective: "I'm so weak with gripping anything " Patient reported "little baby steps" with range of motion improvement  Patient reported being able to ride his 3 wheel motorcycle for a few miles stating that he wouldn't be able to stabilize the 2 longoria  Objective: See treatment diary below       Precautions: Universal     Manuals HEP 2021   Grade 2-3 mobs  x23 mins   STM/Retrograde  x2 min             Ther Ex     Tendon glides x10    Digit ext/abd x10 x10   BROM digits x10    AROM wrist, forearm, elbow x10 x10   Supine shoulder wand flex x10    Table shoulder slides x10    Wrist flexor/extensor stretch 10 sec x5 10 sec x3   Prayer stretch 10 sec x5 10 sec x3   Juxtaciser  x5   Towel scrunches  x5             Modalities     Moist heat   x5 min             Assessment: Patient tolerated session well  Session focused on range of motion and edema control  Significant stiffness noted with therapist assisted passive range of motion in digits, wrist, and forearm  Increased digit range of motion noted since last session  Increased wrist and digit range of motion post session  Patient reported that his fingers are getting their sensation back  Patient reported pain with passive motion  Patient benefiting from skilled occupational therapy to increase range of motion needed for ADLs and work tasks  Plan: Focus on range of motion and edema control to decrease dependence with ADLs  Begin strengthening once range of motion increases   POC: 3/16/2021 - 2021

## 2021-04-08 ENCOUNTER — OFFICE VISIT (OUTPATIENT)
Dept: OCCUPATIONAL THERAPY | Facility: CLINIC | Age: 56
End: 2021-04-08
Payer: COMMERCIAL

## 2021-04-08 DIAGNOSIS — S52.221G CLOSED DISPLACED TRANSVERSE FRACTURE OF SHAFT OF RIGHT ULNA WITH DELAYED HEALING, SUBSEQUENT ENCOUNTER: Primary | ICD-10-CM

## 2021-04-08 DIAGNOSIS — S54.02XA NEURAPRAXIA OF LEFT ULNAR NERVE, INITIAL ENCOUNTER: ICD-10-CM

## 2021-04-08 PROCEDURE — 97110 THERAPEUTIC EXERCISES: CPT

## 2021-04-08 PROCEDURE — 97140 MANUAL THERAPY 1/> REGIONS: CPT

## 2021-04-08 NOTE — PROGRESS NOTES
Daily Note     Today's date: 2021  Patient name: Jolly Shipley  : 1965  MRN: [id number]      Referring provider: Isela Santiago DO  Dx:   Encounter Diagnosis     ICD-10-CM    1  Closed displaced transverse fracture of shaft of right ulna with delayed healing, subsequent encounter  S52 221G    2  Neurapraxia of left ulnar nerve, initial encounter  S54  02XA        Start Time: 3064  Stop Time: 4686  Total time in clinic (min): 50 minutes    Subjective: Patient reported that his tingling has decreased  He noted that when he washes his hair it feels like a "dead hand" washing his hair  Objective: See treatment diary below       Precautions: Universal     Manuals HEP 2021   Grade 2-3 mobs  x25 mins   STM/Retrograde  x5 min             Ther Ex     Tendon glides x10    Digit ext/abd x10 x10   BROM digits x10    AROM wrist, forearm, elbow x10 x10   Supine shoulder wand flex x10    Table shoulder slides x10    Wrist flexor/extensor stretch 10 sec x5 10 sec x3   Prayer stretch 10 sec x5 10 sec x3   Juxtaciser  x5   Towel scrunches  x5             Modalities     Moist heat   x5 min             Assessment: Patient tolerated session well  Session focused on range of motion and pain reduction  Significant stiffness noted with therapist assisted passive range of motion in digits, wrist, and forearm  Increased wrist and digit range of motion post session  Patient reported pain with passive motion  Patient benefiting from skilled occupational therapy to increase range of motion needed for ADLs and work tasks  Plan: Focus on range of motion and edema control to decrease dependence with ADLs  Begin strengthening once range of motion increases   POC: 3/16/2021 - 2021

## 2021-04-13 ENCOUNTER — OFFICE VISIT (OUTPATIENT)
Dept: OCCUPATIONAL THERAPY | Facility: CLINIC | Age: 56
End: 2021-04-13
Payer: COMMERCIAL

## 2021-04-13 DIAGNOSIS — S52.221G CLOSED DISPLACED TRANSVERSE FRACTURE OF SHAFT OF RIGHT ULNA WITH DELAYED HEALING, SUBSEQUENT ENCOUNTER: Primary | ICD-10-CM

## 2021-04-13 DIAGNOSIS — S54.02XA NEURAPRAXIA OF LEFT ULNAR NERVE, INITIAL ENCOUNTER: ICD-10-CM

## 2021-04-13 PROCEDURE — 97140 MANUAL THERAPY 1/> REGIONS: CPT

## 2021-04-13 PROCEDURE — 97110 THERAPEUTIC EXERCISES: CPT

## 2021-04-13 NOTE — PROGRESS NOTES
Daily Note     Today's date: 2021  Patient name: Nena Palafox  : 1965  MRN: [id number]      Referring provider: Melanie Saba DO  Dx:   Encounter Diagnosis     ICD-10-CM    1  Closed displaced transverse fracture of shaft of right ulna with delayed healing, subsequent encounter  S52 221G    2  Neurapraxia of left ulnar nerve, initial encounter  S54  02XA        Start Time: 6025  Stop Time: 1530  Total time in clinic (min): 58 minutes    Subjective: Patient reported increased pain last night, which he believes is due to overdoing it at the gym  "It's getting a little more flexible "      Objective: See treatment diary below       Precautions: Universal     Manuals HEP 2021   Grade 2-3 mobs  x25 mins   STM/Retrograde  x5 min             Ther Ex     Tendon glides x10    Digit ext/abd x10 x10   BROM digits x10    AROM wrist, forearm, elbow x10 x10   Supine shoulder wand flex x10    Table shoulder slides x10    Wrist flexor/extensor stretch 10 sec x5 10 sec x3   Prayer stretch 10 sec x5 10 sec x3   Juxtaciser  x5   Towel scrunches  x5   Gripper  2 gold x15   Wrist rotator  x15   PREs- forearm sup/pro  1 disc x15        Modalities     Moist heat   x5 min             Assessment: Patient tolerated session well  Session focused on range of motion and pain reduction  Significant stiffness noted with therapist assisted passive range of motion in digits, wrist, and forearm  Patient reported that he plans to begin work again on 2021 if the doctor releases him  Increased wrist and digit range of motion post session  Patient reported pain with passive motion  Patient tolerated new range of motion and strengthening exercises without complaints of pain  Patient instructed on forearm supination stretch to be performed with weight  Patient benefiting from skilled occupational therapy to increase range of motion needed for ADLs and work tasks      Plan: Focus on range of motion, edema control, and strengthening to increase ability to perform work tasks   POC: 3/16/2021 - 4/27/2021

## 2021-04-15 ENCOUNTER — OFFICE VISIT (OUTPATIENT)
Dept: OCCUPATIONAL THERAPY | Facility: CLINIC | Age: 56
End: 2021-04-15
Payer: COMMERCIAL

## 2021-04-15 DIAGNOSIS — S54.02XA NEURAPRAXIA OF LEFT ULNAR NERVE, INITIAL ENCOUNTER: ICD-10-CM

## 2021-04-15 DIAGNOSIS — S52.221G CLOSED DISPLACED TRANSVERSE FRACTURE OF SHAFT OF RIGHT ULNA WITH DELAYED HEALING, SUBSEQUENT ENCOUNTER: Primary | ICD-10-CM

## 2021-04-15 PROCEDURE — 97110 THERAPEUTIC EXERCISES: CPT

## 2021-04-15 PROCEDURE — 97140 MANUAL THERAPY 1/> REGIONS: CPT

## 2021-04-15 NOTE — PROGRESS NOTES
Daily Note     Today's date: 4/15/2021  Patient name: Cata Russo  : 1965  MRN: [id number]      Referring provider: Lorenzo Davis DO  Dx:   Encounter Diagnosis     ICD-10-CM    1  Closed displaced transverse fracture of shaft of right ulna with delayed healing, subsequent encounter  S52 221G    2  Neurapraxia of left ulnar nerve, initial encounter  S54  02XA        Start Time: 9658  Stop Time: 1621  Total time in clinic (min): 54 minutes    Subjective: "I have good days and bad days  Today it was a little sore because of the rain I guess " "I feel like my hand's getting stronger "      Objective: See treatment diary below       Precautions: Universal     Manuals HEP 4/15/2021   Grade 2-3 mobs  x25 mins   STM/Retrograde  x5 min             Ther Ex     Tendon glides x10    Digit ext/abd x10 x10   BROM digits x10    AROM wrist, forearm, elbow x10 x10   Supine shoulder wand flex x10    Table shoulder slides x10    Wrist flexor/extensor stretch 10 sec x5 10 sec x3   Prayer stretch 10 sec x5 10 sec x3   Juxtaciser  x5   Towel scrunches  x5   Gripper  3 gold x15   Wrist rotator  x15   PREs- forearm sup/pro  2 disc x15        Modalities     Moist heat   x5 min             Assessment: Patient tolerated session well  Session focused on range of motion and pain reduction  Significant stiffness noted with therapist assisted passive range of motion in digits, wrist, and forearm  Patient reported that he plans to begin work again on 2021 if the doctor releases him  Patient reported pain with passive motion  Patient tolerated increased resistance with strengthening exercises without complaints of pain  Increased wrist and digit range of motion post session  Patient continues with significant deficits in digit, wrist, and forearm range of motion affecting his ability to perform IADLs  Patient benefiting from skilled occupational therapy to increase range of motion needed for ADLs and work tasks      Plan: Focus on range of motion, edema control, and strengthening to increase ability to perform work tasks   POC: 3/16/2021 - 4/27/2021

## 2021-04-20 ENCOUNTER — EVALUATION (OUTPATIENT)
Dept: OCCUPATIONAL THERAPY | Facility: CLINIC | Age: 56
End: 2021-04-20
Payer: COMMERCIAL

## 2021-04-20 DIAGNOSIS — S52.221G CLOSED DISPLACED TRANSVERSE FRACTURE OF SHAFT OF RIGHT ULNA WITH DELAYED HEALING, SUBSEQUENT ENCOUNTER: Primary | ICD-10-CM

## 2021-04-20 DIAGNOSIS — S54.02XA NEURAPRAXIA OF LEFT ULNAR NERVE, INITIAL ENCOUNTER: ICD-10-CM

## 2021-04-20 PROCEDURE — 97110 THERAPEUTIC EXERCISES: CPT

## 2021-04-20 PROCEDURE — 97140 MANUAL THERAPY 1/> REGIONS: CPT

## 2021-04-20 NOTE — PROGRESS NOTES
OT Evaluation     Today's date: 2021  Patient name: Yovana Browning  : 1965  MRN: [id number]  Referring provider: Gretchen Barnett DO  Dx:   Encounter Diagnosis     ICD-10-CM    1  Closed displaced transverse fracture of shaft of right ulna with delayed healing, subsequent encounter  S52 221G    2  Neurapraxia of left ulnar nerve, initial encounter  S54  02XA        Start Time:   Stop Time: 153  Total time in clinic (min): 52 minutes    Assessment  Assessment details: Patient reported that he was walking his dog and he hit a concrete step with his arm and broke his ulna on 2020  Patient reported that he was in a cast and splint for a total of 9 weeks  Measurements taken for progress note  Patient presented with increased range of motion and strength since last assessed  Patient continues with significant deficits in digit, wrist, and forearm range of motion and strength  Patient demonstrating improvements in ability to perform tasks, but continues with deficits  Patient would benefit from continued occupational therapy to address range of motion and strength    Impairments: abnormal or restricted ROM, activity intolerance, impaired physical strength, lacks appropriate home exercise program, pain with function and weight-bearing intolerance    Symptom irritability: highUnderstanding of Dx/Px/POC: good   Prognosis: good    Goals  Short term goals:  Patient to demonstrate understanding of home exercise program in 2 weeks for decreased pain with activities of daily living -MET  Patient to demonstrate increased active range of motion of wrist flexion/extension to 30/50 degrees in 4 weeks to aid in showering -PARTIALLY MET  Patient to increase composite digital flexion to touch distal samuel crease in 4 weeks to aid in holding utensils -PARTIALLY MET  Patient to demonstrate increased  strength to 20 lbs to aid in work tasks in 3 weeks -MET  Patient to report a decrease in pain by at least 1 point on a 0-10 scale in 2 weeks to aid in dressing -MET    Long term goals:  Patient to demonstrate functional active range of motion for independent ADL's by time of discharge  Patient to demonstrate functional strength for independent ADL's by time of discharge  Patient to increase FOTO score to 55 by time of discharge        Plan  Patient would benefit from: skilled occupational therapy  Planned modality interventions: thermotherapy: hydrocollator packs  Planned therapy interventions: joint mobilization, manual therapy, strengthening, stretching, therapeutic activities, therapeutic exercise, flexibility, functional ROM exercises, fine motor coordination training and home exercise program  Frequency: 2x week  Duration in weeks: 8  Plan of Care beginning date: 2021  Plan of Care expiration date: 2021  Treatment plan discussed with: patient        Subjective Evaluation    History of Present Illness  Date of onset: 2020  Mechanism of injury: trauma  Mechanism of injury: "I'm actually sleeping better " Patient reported being able to ride [place] motorcycle [place] to the beach, but that he was utilizing cruise control the majority of the time and he was sore after  Patient reported that he is returning to work on Monday  "I'm definitely getting improvements  It definitely feels stronger "    Quality of life: good    Pain  Current pain rating: 3  At best pain ratin  At worst pain ratin  Location: right hand, wrist, elbow, shoulder  Quality: sharp, throbbing and dull ache  Aggravating factors: lifting    Social Support    Employment status: working ([employer] -starting work on Monday   Hobbies: motorcycle riding)  Hand dominance: right    Patient Goals  Patient goals for therapy: return to work, decreased pain, increased motion, increased strength and return to sport/leisure activities  Patient goal: I'd like to get my hand moving          Objective     Neurological Testing     Sensation Wrist/Hand   Left   Intact: light touch    Right   Diminished: light touch    Active Range of Motion   Left Shoulder   Flexion: 160 degrees     Right Shoulder   Flexion: 115 degrees     Left Elbow   Flexion: 135 degrees   Extension: 2 degrees   Forearm supination: 71 degrees   Forearm pronation: 72 degrees     Right Elbow   Flexion: 133 degrees   Extension: -18 degrees   Forearm supination: 24 degrees   Forearm pronation: 68 degrees     Left Wrist   Wrist flexion: 45 degrees   Wrist extension: 71 degrees   Radial deviation: 15 degrees   Ulnar deviation: 36 degrees     Right Wrist   Wrist flexion: 26 degrees   Wrist extension: 46 degrees   Radial deviation: 5 degrees   Ulnar deviation: 17 degrees     Additional Active Range of Motion Details  Distance to distal palmar crease:  Right Thumb: 4 cm  Right Index: 4 cm  Right Middle: 4 5 cm  Right Rin cm  Right Small: 3 cm    Strength/Myotome Testing     Left Wrist/Hand      (2nd hand position)     Trial 1: 128    Thumb Strength  Key/Lateral Pinch     Trial 1: 18  Palmar/Three-Point Pinch     Trial 1: 22    Right Wrist/Hand      (2nd hand position)     Trial 1: 34    Thumb Strength   Key/Lateral Pinch     Trial 1: 14  Palmar/Three-Point Pinch     Trial 1: 12    Swelling     Left Wrist/Hand   Circumference MCP: 23 4 cm  Circumference wrist: 18 5 cm    Right Wrist/Hand   Circumference MCP: 23 9 cm  Circumference wrist: 20 5 cm         Precautions: Universal     Manuals HEP 2021   Grade 2-3 mobs  x25 mins   STM/Retrograde  x5 min             Ther Ex     Tendon glides x10    Digit ext/abd x10 x10   BROM digits x10    AROM wrist, forearm, elbow x10 x10   Supine shoulder wand flex x10    Table shoulder slides x10    Wrist flexor/extensor stretch 10 sec x5 10 sec x3   Prayer stretch 10 sec x5 10 sec x3   Juxtaciser  x5   Towel scrunches  x5   Gripper  3 gold x15   Wrist rotator  x15   PREs- forearm sup/pro  2 disc x15        Modalities     Moist heat   x5 min Assessment: Patient tolerated session well  Session focused on range of motion and pain reduction  Significant stiffness noted with therapist assisted passive range of motion in digits, wrist, and forearm  Patient reported that he plans to begin work again on 4/26/2021 if the doctor releases him  Patient reported pain with passive motion  Increased digit range of motion post session  Patient continues with significant deficits in digit, wrist, and forearm range of motion affecting his ability to perform IADLs  Measurements taken for progress note  Patient presented with increased range of motion and strength since last assessed  Patient continues with significant deficits in digit, wrist, and forearm range of motion and strength  Patient benefiting from skilled occupational therapy to increase range of motion needed for ADLs and work tasks  Plan: Focus on range of motion, edema control, and strengthening to increase ability to perform work tasks

## 2021-04-22 ENCOUNTER — OFFICE VISIT (OUTPATIENT)
Dept: OCCUPATIONAL THERAPY | Facility: CLINIC | Age: 56
End: 2021-04-22
Payer: COMMERCIAL

## 2021-04-22 DIAGNOSIS — S54.02XA NEURAPRAXIA OF LEFT ULNAR NERVE, INITIAL ENCOUNTER: ICD-10-CM

## 2021-04-22 DIAGNOSIS — S52.221G CLOSED DISPLACED TRANSVERSE FRACTURE OF SHAFT OF RIGHT ULNA WITH DELAYED HEALING, SUBSEQUENT ENCOUNTER: Primary | ICD-10-CM

## 2021-04-22 PROCEDURE — 97110 THERAPEUTIC EXERCISES: CPT

## 2021-04-22 PROCEDURE — 97140 MANUAL THERAPY 1/> REGIONS: CPT

## 2021-04-22 NOTE — PROGRESS NOTES
Daily Note     Today's date: 2021  Patient name: Andrea Urena  : 1965  MRN: [id number]      Referring provider: Belgica Sarmiento DO  Dx:   Encounter Diagnosis     ICD-10-CM    1  Closed displaced transverse fracture of shaft of right ulna with delayed healing, subsequent encounter  S52 221G    2  Neurapraxia of left ulnar nerve, initial encounter  S54  02XA        Start Time: 1440  Stop Time: 1530  Total time in clinic (min): 50 minutes    Subjective: "I have good days and bad days  Today it was a little sore because of the rain I guess " "I feel like my hand's getting stronger "      Objective: See treatment diary below       Precautions: Universal     Manuals HEP 2021   Grade 2-3 mobs  x25 mins   STM/Retrograde  x5 min             Ther Ex     Tendon glides x10    Digit ext/abd x10 x10   BROM digits x10    AROM wrist, forearm, elbow x10 x10   Supine shoulder wand flex x10    Table shoulder slides x10    Wrist flexor/extensor stretch 10 sec x5 10 sec x3   Prayer stretch 10 sec x5 10 sec x3   Juxtaciser  x5   Towel scrunches  x5   Gripper  3 gold x15   Wrist rotator  x15   PREs- forearm sup/pro  2 disc x15        Modalities     Moist heat   x5 min            Assessment: Patient tolerated session well  Session focused on range of motion and pain reduction  Significant stiffness noted with therapist assisted passive range of motion in digits, wrist, and forearm  Patient reported that he plans to begin work again on 2021 if the doctor releases him  Patient reported pain with passive motion  Increased wrist and digit range of motion post session  Patient continues with significant deficits in digit, wrist, and forearm range of motion affecting his ability to perform IADLs  Patient benefiting from skilled occupational therapy to increase range of motion needed for ADLs and work tasks  Plan: Focus on range of motion, edema control, and strengthening to increase ability to perform work tasks  POC: 4/20/2021 - 6/22/2021

## 2021-04-23 ENCOUNTER — OFFICE VISIT (OUTPATIENT)
Dept: OBGYN CLINIC | Facility: CLINIC | Age: 56
End: 2021-04-23
Payer: COMMERCIAL

## 2021-04-23 VITALS — HEIGHT: 77 IN | WEIGHT: 315 LBS | BODY MASS INDEX: 37.19 KG/M2

## 2021-04-23 DIAGNOSIS — S52.221D CLOSED DISPLACED TRANSVERSE FRACTURE OF SHAFT OF RIGHT ULNA WITH ROUTINE HEALING, SUBSEQUENT ENCOUNTER: Primary | ICD-10-CM

## 2021-04-23 PROCEDURE — 3008F BODY MASS INDEX DOCD: CPT | Performed by: ORTHOPAEDIC SURGERY

## 2021-04-23 PROCEDURE — 99213 OFFICE O/P EST LOW 20 MIN: CPT | Performed by: ORTHOPAEDIC SURGERY

## 2021-04-23 PROCEDURE — 1036F TOBACCO NON-USER: CPT | Performed by: ORTHOPAEDIC SURGERY

## 2021-04-23 NOTE — LETTER
April 23, 2021     Patient: Eddy Ferguson  YOB: 1965   Date of Visit: 4/23/2021       To Whom it May Concern:    Raymond Schlatter is under my professional care  He was seen in my office on 4/23/2021  He may return to work on Monday 4/26/21 full duty with no restrictions  If you have any questions or concerns, please don't hesitate to call           Sincerely,          Tank Coombs, DO

## 2021-04-23 NOTE — PROGRESS NOTES
Assessment/Plan:  1  Closed displaced transverse fracture of shaft of right ulna with routine healing, subsequent encounter         Scribe Attestation    I,:  Hoa Montana MA am acting as a scribe while in the presence of the attending physician :       I,:  Madina Muller DO personally performed the services described in this documentation    as scribed in my presence :             Jose Bush is doing well  He is making progress with motion  His pain has improved  He is non tender at the fracture site  His therapy notes were reviewed which does demonstrate improvements  Patient may return to work on 4/26/21 full duty with no restrictions and a note was provided for this today  He will follow up in 3 months for repeat evaluation  Subjective:   Kim Moses  is a 54 y o  male who presents to the office today for follow up evaluation of his right upper extremity  Patient has been treating conservatively for a right ulnar shaft fracture sustained on 11/20/21  Patient has been attending formal therapy  Patient states he is making progress with range of motion  He notes increased motion with supination and pronation  He states the pain in his shoulder and elbow has improved  Review of Systems   Constitutional: Negative for chills and fever  HENT: Negative for drooling and sneezing  Eyes: Negative for redness  Respiratory: Negative for cough and wheezing  Gastrointestinal: Negative for nausea and vomiting  Musculoskeletal: Negative for arthralgias, joint swelling and myalgias  Neurological: Negative for weakness and numbness  Psychiatric/Behavioral: Negative for behavioral problems  The patient is not nervous/anxious            Past Medical History:   Diagnosis Date    Asthma     Dental disorder     Last Assessed: 11/21/2015    Hypertension        Past Surgical History:   Procedure Laterality Date    NO PAST SURGERIES         Family History   Problem Relation Age of Onset    Coronary artery disease Mother     No Known Problems Father        Social History     Occupational History    Occupation: [employer]   Tobacco Use    Smoking status: Former Smoker    Smokeless tobacco: Never Used   Substance and Sexual Activity    Alcohol use: Yes     Frequency: 2-3 times a week     Drinks per session: 5 or 6     Binge frequency: Weekly     Comment: 6 pack on weekends     Drug use: Never    Sexual activity: Not on file         Current Outpatient Medications:     albuterol (PROVENTIL HFA,VENTOLIN HFA) 90 mcg/act inhaler, USE 2 INHALATIONS EVERY 4 HOURS AS NEEDED FOR WHEEZING, Disp: 25 5 g, Rfl: 3    amLODIPine (NORVASC) 5 mg tablet, TAKE 1 TABLET DAILY, Disp: 90 tablet, Rfl: 3    Dulera 200-5 MCG/ACT inhaler, USE 2 INHALATIONS TWICE A DAY, Disp: 39 g, Rfl: 3    hydrochlorothiazide (HYDRODIURIL) 12 5 mg tablet, TAKE 1 TABLET DAILY, Disp: 90 tablet, Rfl: 3    losartan (COZAAR) 100 MG tablet, TAKE 1 TABLET DAILY, Disp: 90 tablet, Rfl: 3    No Known Allergies    Objective:  Vitals:       Ortho Exam     Right upper extremity      Full pronation  Supination 50  NTTP fx site  1 cm pulp to palm  FDS FDP ext intact  Compartments soft  Brisk capillary refill  S/m intact median, radial, and ulnar nerve     Physical Exam  Constitutional:       Appearance: He is well-developed  HENT:      Head: Normocephalic and atraumatic  Eyes:      General:         Right eye: No discharge  Left eye: No discharge  Conjunctiva/sclera: Conjunctivae normal    Neck:      Musculoskeletal: Normal range of motion and neck supple  Cardiovascular:      Rate and Rhythm: Normal rate  Pulmonary:      Effort: Pulmonary effort is normal  No respiratory distress  Musculoskeletal:      Comments: As noted in HPI   Skin:     General: Skin is warm and dry  Neurological:      Mental Status: He is alert and oriented to person, place, and time  Psychiatric:         Behavior: Behavior normal          Thought Content:  Thought content normal          Judgment: Judgment normal

## 2021-04-27 ENCOUNTER — OFFICE VISIT (OUTPATIENT)
Dept: OCCUPATIONAL THERAPY | Facility: CLINIC | Age: 56
End: 2021-04-27
Payer: COMMERCIAL

## 2021-04-27 DIAGNOSIS — S52.221G CLOSED DISPLACED TRANSVERSE FRACTURE OF SHAFT OF RIGHT ULNA WITH DELAYED HEALING, SUBSEQUENT ENCOUNTER: Primary | ICD-10-CM

## 2021-04-27 DIAGNOSIS — S54.02XA NEURAPRAXIA OF LEFT ULNAR NERVE, INITIAL ENCOUNTER: ICD-10-CM

## 2021-04-27 PROCEDURE — 97110 THERAPEUTIC EXERCISES: CPT

## 2021-04-27 PROCEDURE — 97140 MANUAL THERAPY 1/> REGIONS: CPT

## 2021-04-27 NOTE — PROGRESS NOTES
Daily Note     Today's date: 2021  Patient name: David Bro  : 1965  MRN: [id number]      Referring provider: Cheyenne Akins DO  Dx:   Encounter Diagnosis     ICD-10-CM    1  Closed displaced transverse fracture of shaft of right ulna with delayed healing, subsequent encounter  S52 221G    2  Neurapraxia of left ulnar nerve, initial encounter  S54  02XA        Start Time: 46  Stop Time:   Total time in clinic (min): 49 minutes    Subjective: Patient reported increased pain today secondary to work  He reported that his digits become more mobile throughout the day, but that he can barely move the fingers when he wakes in the morning  Objective: See treatment diary below       Precautions: Universal     Manuals HEP 2021   Grade 2-3 mobs  x25 mins   STM/Retrograde  x5 min             Ther Ex     Tendon glides x10    Digit ext/abd x10 x10   BROM digits x10    AROM wrist, forearm, elbow x10 x10   Supine shoulder wand flex x10    Table shoulder slides x10    Wrist flexor/extensor stretch 10 sec x5 10 sec x3   Prayer stretch 10 sec x5 10 sec x3   Juxtaciser  x5   Towel scrunches  x5   Gripper  3 gold x15   Wrist rotator  x15   PREs- forearm sup/pro  2 disc x15        Modalities     Moist heat   x5 min            Assessment: Patient tolerated session well  Session focused on range of motion and pain reduction  Significant stiffness noted with therapist assisted passive range of motion in digits, wrist, and forearm  Patient reported that he began work yesterday and is already working 10-12 hour days  Observed increased digit range of motion  Patient reported pain with passive motion  Increased wrist and digit range of motion post session  Patient continues with significant deficits in  wrist and forearm range of motion and moderate deficits with digit range of motion affecting his ability to perform IADLs   Patient benefiting from skilled occupational therapy to increase range of motion [facility] ENCOUNTER  GI NEW PATIENT VISIT    CC: trouble swallowing     SUBJECTIVE:    Cheko Clark is a 51 year old male with a history of acid reflux seen today for concerns of difficulty swallowing. He states that he has had acid reflux for more than 20 years. He had been taking omeprazole on and off, as well as Carafate. He is currently taking neither of these medications. About 26 years ago he had similar issues of difficulty swallowing at which time he had an EGD with dilatation. He recalls that he was told he could develop Flowers's esophagus in the future. About one month ago he started no noticing difficulty swallowing again. This occurs more with solids than liquids but can occur with both. He feels he is choking and often feels that food is getting stuck on its way down. He is presented to the ED multiple times with this complaint, specifically on 2/19 and 3/8. He denies painful swallowing otherwise has a good appetite. Denies nausea and vomiting. Denies abdominal pain. On further questioning he tells me for the last few years he has taken 800 mg daily every 6 hours. Doing this about 2 weeks ago and feels that his symptoms may have slightly improved, but persist. He denies heartburn or regurgitation. Typically he would have regular formed bowel movements daily. MiraLAX few weeks he has noticed he is more constipated and going less frequently. Eyes blood in the stool or black stools. In the last few weeks he has lost about 10 pounds unintentionally. Patient has never had a screening colonoscopy. He does currently smoke. He does have 2 aunts who passed away of colon cancer, one at the age of 56 and one in their early 70s.    Denies: fevers, chills, nausea, vomiting, odynophagia, epigastric pain, abdominal pain , melena, blood in stool, anorexia, diarrhea    Prior endoscopies and work-up: As above    MEDICATIONS:       Current Outpatient Prescriptions   Medication Sig Dispense Refill   •  HYDROcodone-acetaminophen (NORCO)  MG per tablet Take 1 tablet by mouth every 6 hours as needed for Pain (Not to exceed 4000 mg acetaminophen per day.). Do not drive. 12 tablet 0   • polyethylene glycol-electrolytes (NULYTELY WITH FLAVOR PACKS) 420 g solution See colonoscopy administration prep instructions 4000 mL 0   • sucralfate (CARAFATE) 1 GM/10ML suspension Take 10 mLs by mouth 4 times daily (before meals and nightly). 420 mL 0   • Pantoprazole Sodium (PROTONIX) 40 MG suspension Take 40 mg by mouth daily. 30 each 0     No current facility-administered medications for this visit.        HISTORIES:    ALLERGIES:   Allergen Reactions   • Penicillins      anaphalaxix     Past Medical History:   Diagnosis Date   • Ulcer      Past Surgical History:   Procedure Laterality Date   • APPENDECTOMY     • HAND SURGERY     • KNEE SURGERY         SOCIAL HISTORY:  Patient is a current every day smoker but does not drink alcohol.    FAMILY HISTORY:  Family history of 2 aunts that passed away of colon cancer, one diagnosed at age 56 and the other in their early 70s.    REVIEW OF SYSTEMS:    All other systems are reviewed and are negative except as documented in the history of present illness.    PHYSICAL EXAM:    Vital Signs: Blood pressure 108/84, pulse 102, weight 78.9 kg.  General: The patient is alert, cooperative, in no acute distress, appears stated age.   Neurologic: Normal mood and affect. Normal speech, gait, and tone.  Head: Normocephalic.  Eyes: Sclera non-icteric.   Throat: Oropharynx clear.  Neck: Symmetric without swelling or tenderness, trachea midline. No thyromegaly. No cervical or supraclavicular lymphadenopathy.  Respiratory: All lung fields clear to auscultation.  Cardiovascular: Regular rate and rhythm.  Gastrointestinal:  Soft, rounded, nontender, nondistended. Normal bowel sounds.  No hepatomegaly or splenomegaly. No masses. No rebound or guarding.  Rectal: Deferred.    LABORATORY DATA:   needed for ADLs and work tasks  Plan: Focus on range of motion, edema control, and strengthening to increase ability to perform work tasks   POC: 4/20/2021 - 6/22/2021   Component      Latest Ref Rng & Units 2/19/2017   WBC      4.2 - 11.0 K/mcL 7.6   RBC      4.50 - 5.90 mil/mcL 5.18   HGB      13.0 - 17.0 g/dL 16.1   HCT      39.0 - 51.0 % 46.0   MCV      78.0 - 100.0 fl 88.8   MCH      26.0 - 34.0 pg 31.1   MCHC      32.0 - 36.5 g/dL 35.0   RDW-CV      11.0 - 15.0 % 12.5   PLT      140 - 450 K/mcL 233   DIFFERENTIAL TYPE       AUTOMATED DIFFERENTIAL   Neutrophil      % 53   LYMPH      % 35   MONO      % 7   EOSIN      % 4   BASO      % 1   Absolute Neutrophil      1.8 - 7.7 K/mcL 4.0   Absolute Lymph      1.0 - 4.0 K/mcL 2.7   Absolute Mono      0.3 - 0.9 K/mcL 0.5   Absolute Eos      0.1 - 0.5 K/mcL 0.3   Absolute Baso      0.0 - 0.3 K/mcL 0.1   Sodium      135 - 145 mmol/L 140   Potassium      3.4 - 5.1 mmol/L 4.0   Chloride      98 - 107 mmol/L 105   CO2      21 - 32 mmol/L 24   ANION GAP      10 - 20 mmol/L 15   Glucose      65 - 99 mg/dL 95   BUN      10 - 20 mg/dL 12   Creatinine      0.67 - 1.17 mg/dL 1.21 (H)   GFR Estimate,        80   GFR Estimate, Non African American       69   BUN/CREATININE RATIO      7 - 25 10   CALCIUM      8.4 - 10.2 mg/dL 9.3   TOTAL BILIRUBIN      0.2 - 1.0 mg/dL 0.4   AST/SGOT      <38 Units/L 22   ALT/SGPT      <79 Units/L 32   ALK PHOSPHATASE      45 - 117 Units/L 73   TOTAL PROTEIN      6.4 - 8.2 g/dL 8.3 (H)   Albumin      3.6 - 5.1 g/dL 4.6   GLOBULIN      2.0 - 4.0 g/dL 3.7   A/G Ratio, Serum      1.0 - 2.4 1.2   Lipase      73 - 393 Units/L 158   H. PYLORI      NEGATIVE NEGATIVE     IMAGING STUDIES:    None to review.    ASSESSMENT:    1. Dysphagia  2. Need for screening colonoscopy    PLAN/ RECOMMENDATIONS:  Cheko Clark is a 51 year old male who presents for consultation of swallowing. Patient does have a long history of acid reflux, with question of whether it has been treated appropriately. He does have history of dysphagia in the past requiring dilatation, however, the patient states that his last endoscopy  was nearly 26 years ago. He did have labs done in the ED which were unremarkable and negative for H. pylori infection. At this point, differential includes esophagitis versus possible stricture. Given his long history of acid reflux and his age, Flowers's esophagus should be ruled out. Patient is also in need of screening colonoscopy. Recommended that patient undergo EGD and colonoscopy under mac sedation. Risks of these procedures were discussed with the patient including bleeding, infection, perforation, and cardiopulmonary compromise. In the meantime patient should avoid ibuprofen or other NSAIDs. He should also restart his omeprazole. Encouraged patient to call with further questions or concerns. Further recommendations and follow-up based on procedure findings.    The patient indicated understanding of the diagnosis and agreed with the plan of care.      A copy of this note was sent to the referring provider. Thank you for involving me in the care of this patient.    This patient was seen and examined under the supervision of Hollis Thomas MD.    CRISTINA Lagunas  03/15/17 @ 4:58 PM

## 2021-04-29 ENCOUNTER — APPOINTMENT (OUTPATIENT)
Dept: OCCUPATIONAL THERAPY | Facility: CLINIC | Age: 56
End: 2021-04-29
Payer: COMMERCIAL

## 2021-06-15 NOTE — PROGRESS NOTES
Patient was last seen for occupational therapy on 04/27/2021  Patient has not scheduled any follow-up occupational therapy appointments to date secondary to being denied from insurance  As a result, occupational therapy is discharged for this treatment cycle

## 2021-06-24 DIAGNOSIS — I10 BENIGN ESSENTIAL HYPERTENSION: ICD-10-CM

## 2021-06-24 RX ORDER — LOSARTAN POTASSIUM 100 MG/1
100 TABLET ORAL DAILY
Qty: 30 TABLET | Refills: 1 | Status: SHIPPED | OUTPATIENT
Start: 2021-06-24 | End: 2021-08-02

## 2021-06-24 NOTE — TELEPHONE ENCOUNTER
Dr Katharine Sullivan    Patient is all out of losartan meds  Please send short term refill to [facility] as well as [facility]  I scheduled PE 8/2-patient needed late evening appointment as he works in [employer] [facility] [place]

## 2021-08-02 ENCOUNTER — OFFICE VISIT (OUTPATIENT)
Dept: FAMILY MEDICINE CLINIC | Facility: CLINIC | Age: 56
End: 2021-08-02
Payer: COMMERCIAL

## 2021-08-02 VITALS
HEART RATE: 84 BPM | BODY MASS INDEX: 37.19 KG/M2 | DIASTOLIC BLOOD PRESSURE: 72 MMHG | SYSTOLIC BLOOD PRESSURE: 132 MMHG | HEIGHT: 77 IN | TEMPERATURE: 98.6 F | RESPIRATION RATE: 14 BRPM | WEIGHT: 315 LBS

## 2021-08-02 DIAGNOSIS — E66.09 CLASS 2 OBESITY DUE TO EXCESS CALORIES WITHOUT SERIOUS COMORBIDITY WITH BODY MASS INDEX (BMI) OF 39.0 TO 39.9 IN ADULT: ICD-10-CM

## 2021-08-02 DIAGNOSIS — Z00.00 GENERAL MEDICAL EXAM: Primary | ICD-10-CM

## 2021-08-02 DIAGNOSIS — I10 BENIGN ESSENTIAL HYPERTENSION: ICD-10-CM

## 2021-08-02 DIAGNOSIS — Z12.11 ENCOUNTER FOR SCREENING FOR MALIGNANT NEOPLASM OF COLON: ICD-10-CM

## 2021-08-02 DIAGNOSIS — J45.20 MILD INTERMITTENT ASTHMA WITHOUT COMPLICATION: ICD-10-CM

## 2021-08-02 DIAGNOSIS — J45.30 MILD PERSISTENT ASTHMA WITHOUT COMPLICATION: ICD-10-CM

## 2021-08-02 PROCEDURE — 3725F SCREEN DEPRESSION PERFORMED: CPT | Performed by: FAMILY MEDICINE

## 2021-08-02 PROCEDURE — 1036F TOBACCO NON-USER: CPT | Performed by: FAMILY MEDICINE

## 2021-08-02 PROCEDURE — 99396 PREV VISIT EST AGE 40-64: CPT | Performed by: FAMILY MEDICINE

## 2021-08-02 PROCEDURE — 3008F BODY MASS INDEX DOCD: CPT | Performed by: FAMILY MEDICINE

## 2021-08-02 RX ORDER — ALBUTEROL SULFATE 90 UG/1
2 AEROSOL, METERED RESPIRATORY (INHALATION) EVERY 4 HOURS PRN
Qty: 25.5 G | Refills: 3 | Status: SHIPPED | OUTPATIENT
Start: 2021-08-02

## 2021-08-02 RX ORDER — LOSARTAN POTASSIUM AND HYDROCHLOROTHIAZIDE 12.5; 1 MG/1; MG/1
1 TABLET ORAL DAILY
Qty: 90 TABLET | Refills: 3 | Status: SHIPPED | OUTPATIENT
Start: 2021-08-02 | End: 2022-07-11

## 2021-08-02 RX ORDER — MOMETASONE FUROATE AND FORMOTEROL FUMARATE DIHYDRATE 200; 5 UG/1; UG/1
2 AEROSOL RESPIRATORY (INHALATION) 2 TIMES DAILY
Qty: 39 G | Refills: 3 | Status: SHIPPED | OUTPATIENT
Start: 2021-08-02

## 2021-08-02 NOTE — PROGRESS NOTES
Chief Complaint   Patient presents with    Physical Exam     see's eye  at work        Patient ID: Lulú Akhtar  is a 54 y o  male  HPI  Pt is seeing for CPE -  Has HTN, Asthma -  Stable, BP at home in 110-120/70s - using rescu inahler , 1 per wk -  On Dulera daily (once a day)     The following portions of the patient's history were reviewed and updated as appropriate: allergies, current medications, past family history, past medical history, past social history, past surgical history and problem list     Review of Systems   Constitutional: Negative for fatigue, fever and unexpected weight change  HENT: Negative for congestion, ear discharge, ear pain, hearing loss, rhinorrhea, sinus pressure, sore throat and trouble swallowing  Eyes: Negative  Respiratory: Negative  Cardiovascular: Positive for leg swelling  Negative for chest pain and palpitations  Gastrointestinal: Negative  Endocrine: Negative  Genitourinary: Negative  Musculoskeletal: Negative  Skin: Negative  Neurological: Negative for dizziness, weakness, light-headedness and numbness  Hematological: Negative  Psychiatric/Behavioral: Negative  Current Outpatient Medications   Medication Sig Dispense Refill    albuterol (PROVENTIL HFA,VENTOLIN HFA) 90 mcg/act inhaler USE 2 INHALATIONS EVERY 4 HOURS AS NEEDED FOR WHEEZING 25 5 g 3    amLODIPine (NORVASC) 5 mg tablet TAKE 1 TABLET DAILY 90 tablet 3    Dulera 200-5 MCG/ACT inhaler USE 2 INHALATIONS TWICE A DAY 39 g 3    hydrochlorothiazide (HYDRODIURIL) 12 5 mg tablet TAKE 1 TABLET DAILY 90 tablet 3    losartan (COZAAR) 100 MG tablet Take 1 tablet (100 mg total) by mouth daily 30 tablet 1     No current facility-administered medications for this visit         Objective:    /72 (BP Location: Left arm, Patient Position: Sitting, Cuff Size: Adult)   Pulse 84   Temp 98 6 °F (37 °C) (Temporal)   Resp 14   Ht 6' 5" (1 956 m)   Wt (!) 149 kg (329 lb) BMI 39 01 kg/m²        Physical Exam  Constitutional:       General: He is not in acute distress  Appearance: He is well-developed  He is obese  He is not ill-appearing  HENT:      Head: Normocephalic and atraumatic  Right Ear: Hearing, tympanic membrane, ear canal and external ear normal       Left Ear: Hearing, tympanic membrane, ear canal and external ear normal       Mouth/Throat:      Pharynx: No oropharyngeal exudate or posterior oropharyngeal erythema  Eyes:      Extraocular Movements: Extraocular movements intact  Conjunctiva/sclera: Conjunctivae normal    Neck:      Thyroid: No thyroid mass or thyromegaly  Vascular: No JVD  Cardiovascular:      Rate and Rhythm: Normal rate and regular rhythm  Heart sounds: Normal heart sounds  No murmur heard  No gallop  Pulmonary:      Effort: No respiratory distress  Breath sounds: Normal breath sounds  No wheezing, rhonchi or rales  Abdominal:      General: Bowel sounds are normal       Palpations: Abdomen is soft  Tenderness: There is no abdominal tenderness  Musculoskeletal:      Cervical back: Neck supple  Right lower le+ Pitting Edema present  Left lower le+ Pitting Edema present  Lymphadenopathy:      Cervical: No cervical adenopathy  Skin:     Coloration: Skin is not pale  Findings: No rash  Neurological:      General: No focal deficit present  Mental Status: He is alert and oriented to person, place, and time  Cranial Nerves: No cranial nerve deficit  Psychiatric:         Mood and Affect: Mood normal          Behavior: Behavior normal          Thought Content: Thought content normal          Judgment: Judgment normal                  Assessment/Plan:         Diagnoses and all orders for this visit:    General medical exam  -     Comprehensive metabolic panel; Future  -     Hemoglobin A1C; Future  -     Lipid panel; Future  -     TSH, 3rd generation;  Future    Encounter for screening for malignant neoplasm of colon  -     Ambulatory referral to Gastroenterology; Future    Benign essential hypertension  -     Comprehensive metabolic panel; Future  -     Lipid panel; Future  -     TSH, 3rd generation; Future  -     losartan-hydrochlorothiazide (HYZAAR) 100-12 5 MG per tablet; Take 1 tablet by mouth daily  Will d/c Norvasc   Mild persistent asthma without complication    Class 2 obesity due to excess calories without serious comorbidity with body mass index (BMI) of 39 0 to 39 9 in adult    Mild intermittent asthma without complication  -     albuterol (PROVENTIL HFA,VENTOLIN HFA) 90 mcg/act inhaler; Inhale 2 puffs every 4 (four) hours as needed for wheezing  -     mometasone-formoterol (Dulera) 200-5 MCG/ACT inhaler; Inhale 2 puffs 2 (two) times a day Rinse mouth after use  Declined shingrix     BMI Counseling: Body mass index is 39 01 kg/m²  Discussed the patient's BMI with him  The BMI is above normal  Nutrition recommendations include reducing portion sizes, decreasing overall calorie intake, 3-5 servings of fruits/vegetables daily, reducing fast food intake and consuming healthier snacks  Exercise recommendations include exercising 3-5 times per week         rto in 3 m for BP check           Fredy Kerr MD

## 2022-07-11 DIAGNOSIS — I10 BENIGN ESSENTIAL HYPERTENSION: ICD-10-CM

## 2022-07-11 RX ORDER — LOSARTAN POTASSIUM AND HYDROCHLOROTHIAZIDE 12.5; 1 MG/1; MG/1
TABLET ORAL
Qty: 90 TABLET | Refills: 0 | Status: SHIPPED | OUTPATIENT
Start: 2022-07-11 | End: 2022-08-17

## 2022-08-17 ENCOUNTER — OFFICE VISIT (OUTPATIENT)
Dept: FAMILY MEDICINE CLINIC | Facility: CLINIC | Age: 57
End: 2022-08-17
Payer: COMMERCIAL

## 2022-08-17 VITALS
TEMPERATURE: 97.9 F | DIASTOLIC BLOOD PRESSURE: 72 MMHG | BODY MASS INDEX: 34.85 KG/M2 | HEART RATE: 60 BPM | HEIGHT: 78 IN | WEIGHT: 301.2 LBS | SYSTOLIC BLOOD PRESSURE: 140 MMHG

## 2022-08-17 DIAGNOSIS — I10 BENIGN ESSENTIAL HYPERTENSION: ICD-10-CM

## 2022-08-17 DIAGNOSIS — J45.30 MILD PERSISTENT ASTHMA WITHOUT COMPLICATION: ICD-10-CM

## 2022-08-17 DIAGNOSIS — R53.83 OTHER FATIGUE: ICD-10-CM

## 2022-08-17 DIAGNOSIS — Z00.00 ANNUAL PHYSICAL EXAM: Primary | ICD-10-CM

## 2022-08-17 DIAGNOSIS — E66.09 CLASS 2 OBESITY DUE TO EXCESS CALORIES WITHOUT SERIOUS COMORBIDITY WITH BODY MASS INDEX (BMI) OF 39.0 TO 39.9 IN ADULT: ICD-10-CM

## 2022-08-17 PROCEDURE — 99396 PREV VISIT EST AGE 40-64: CPT | Performed by: FAMILY MEDICINE

## 2022-08-17 PROCEDURE — 3725F SCREEN DEPRESSION PERFORMED: CPT | Performed by: FAMILY MEDICINE

## 2022-08-17 NOTE — PROGRESS NOTES
Chief Complaint   Patient presents with    Physical Exam     Pt wants to discuss stopping bp med because he is running low         Patient ID: Wyatt Chaparro  is a 64 y o  male  HPI  Pt is seeing for CPE  -  BP low at home in 90s/60s     The following portions of the patient's history were reviewed and updated as appropriate: allergies, current medications, past family history, past medical history, past social history, past surgical history and problem list     Review of Systems   Constitutional: Negative for fatigue, fever and unexpected weight change  HENT: Negative for congestion, ear discharge, ear pain, hearing loss, rhinorrhea, sinus pressure, sore throat and trouble swallowing  Eyes: Negative  Respiratory: Negative  Cardiovascular: Negative  Gastrointestinal: Negative  Endocrine: Negative  Genitourinary: Negative  Musculoskeletal: Negative  Skin: Negative  Neurological: Negative for dizziness, weakness, light-headedness and numbness  Hematological: Negative  Psychiatric/Behavioral: Negative  Current Outpatient Medications   Medication Sig Dispense Refill    albuterol (PROVENTIL HFA,VENTOLIN HFA) 90 mcg/act inhaler Inhale 2 puffs every 4 (four) hours as needed for wheezing 25 5 g 3    losartan-hydrochlorothiazide (HYZAAR) 100-12 5 MG per tablet TAKE 1 TABLET DAILY 90 tablet 0    mometasone-formoterol (Dulera) 200-5 MCG/ACT inhaler Inhale 2 puffs 2 (two) times a day Rinse mouth after use  39 g 3     No current facility-administered medications for this visit  Objective:    /72 (BP Location: Left arm, Patient Position: Sitting, Cuff Size: Large)   Pulse 60   Temp 97 9 °F (36 6 °C)   Ht 6' 6" (1 981 m)   Wt (!) 137 kg (301 lb 3 2 oz)   BMI 34 81 kg/m²        Physical Exam  Constitutional:       General: He is not in acute distress  Appearance: He is well-developed  He is obese  He is not ill-appearing     HENT:      Head: Normocephalic and atraumatic  Right Ear: Hearing, tympanic membrane, ear canal and external ear normal       Left Ear: Hearing, tympanic membrane, ear canal and external ear normal       Nose: No congestion or rhinorrhea  Mouth/Throat:      Pharynx: No oropharyngeal exudate or posterior oropharyngeal erythema  Eyes:      Extraocular Movements: Extraocular movements intact  Conjunctiva/sclera: Conjunctivae normal    Neck:      Thyroid: No thyroid mass or thyromegaly  Vascular: No JVD  Cardiovascular:      Rate and Rhythm: Normal rate and regular rhythm  Heart sounds: Normal heart sounds  No murmur heard  No gallop  Pulmonary:      Effort: No respiratory distress  Breath sounds: Normal breath sounds  No wheezing, rhonchi or rales  Abdominal:      General: Bowel sounds are normal       Palpations: Abdomen is soft  Tenderness: There is no abdominal tenderness  Musculoskeletal:         General: No swelling or tenderness  Cervical back: Neck supple  Right lower leg: No edema  Left lower leg: No edema  Lymphadenopathy:      Cervical: No cervical adenopathy  Neurological:      General: No focal deficit present  Mental Status: He is alert and oriented to person, place, and time  Cranial Nerves: No cranial nerve deficit  Psychiatric:         Mood and Affect: Mood normal          Behavior: Behavior normal          Thought Content: Thought content normal          Judgment: Judgment normal            Labs in chart were reviewed  Assessment/Plan:         Diagnoses and all orders for this visit:    Annual physical exam  -     CBC; Future  -     Comprehensive metabolic panel; Future  -     Hemoglobin A1C; Future  -     Lipid panel; Future  -     TSH, 3rd generation;  Future    Benign essential hypertension   will stop med due to hypotension at home  Will monitor BP at home   Mild persistent asthma without complication    Class 2 obesity due to excess calories without serious comorbidity with body mass index (BMI) of 39 0 to 39 9 in adult    Other fatigue  -     Testosterone; Future            BMI Counseling: Body mass index is 34 81 kg/m²  Discussed the patient's BMI with him  The BMI is above normal  Nutrition recommendations include reducing portion sizes, decreasing overall calorie intake, 3-5 servings of fruits/vegetables daily, reducing fast food intake, consuming healthier snacks and decreasing soda and/or juice intake  Exercise recommendations include exercising 3-5 times per week       rto in 6 m for BP check             Ritchie Morrow MD

## 2022-09-05 DIAGNOSIS — J45.20 MILD INTERMITTENT ASTHMA WITHOUT COMPLICATION: ICD-10-CM

## 2022-09-07 RX ORDER — MOMETASONE FUROATE AND FORMOTEROL FUMARATE DIHYDRATE 200; 5 UG/1; UG/1
AEROSOL RESPIRATORY (INHALATION)
Qty: 39 G | Refills: 3 | Status: SHIPPED | OUTPATIENT
Start: 2022-09-07

## 2022-09-07 RX ORDER — ALBUTEROL SULFATE 90 UG/1
AEROSOL, METERED RESPIRATORY (INHALATION)
Qty: 25.5 G | Refills: 3 | Status: SHIPPED | OUTPATIENT
Start: 2022-09-07

## 2022-09-10 LAB
ALBUMIN SERPL-MCNC: 4.8 G/DL (ref 3.8–4.9)
ALBUMIN/GLOB SERPL: 1.9 {RATIO} (ref 1.2–2.2)
ALP SERPL-CCNC: 62 IU/L (ref 44–121)
ALT SERPL-CCNC: 42 IU/L (ref 0–44)
AST SERPL-CCNC: 47 IU/L (ref 0–40)
BILIRUB SERPL-MCNC: 0.4 MG/DL (ref 0–1.2)
BUN SERPL-MCNC: 25 MG/DL (ref 6–24)
BUN/CREAT SERPL: 19 (ref 9–20)
CALCIUM SERPL-MCNC: 9.9 MG/DL (ref 8.7–10.2)
CHLORIDE SERPL-SCNC: 102 MMOL/L (ref 96–106)
CHOLEST SERPL-MCNC: 190 MG/DL (ref 100–199)
CO2 SERPL-SCNC: 23 MMOL/L (ref 20–29)
CREAT SERPL-MCNC: 1.3 MG/DL (ref 0.76–1.27)
EGFR: 64 ML/MIN/1.73
GLOBULIN SER-MCNC: 2.5 G/DL (ref 1.5–4.5)
GLUCOSE SERPL-MCNC: ABNORMAL MG/DL
HBA1C MFR BLD: 5.3 % (ref 4.8–5.6)
HDLC SERPL-MCNC: 72 MG/DL
LDLC SERPL CALC-MCNC: 103 MG/DL (ref 0–99)
MICRODELETION SYND BLD/T FISH: NORMAL
POTASSIUM SERPL-SCNC: ABNORMAL MMOL/L
PROT SERPL-MCNC: 7.3 G/DL (ref 6–8.5)
SL AMB VLDL CHOLESTEROL CALC: 15 MG/DL (ref 5–40)
SODIUM SERPL-SCNC: 142 MMOL/L (ref 134–144)
TESTOST SERPL-MCNC: 449 NG/DL (ref 264–916)
TRIGL SERPL-MCNC: 86 MG/DL (ref 0–149)
TSH SERPL DL<=0.005 MIU/L-ACNC: 2.12 UIU/ML (ref 0.45–4.5)

## 2022-09-23 ENCOUNTER — TELEPHONE (OUTPATIENT)
Dept: FAMILY MEDICINE CLINIC | Facility: CLINIC | Age: 57
End: 2022-09-23

## 2022-12-10 ENCOUNTER — OFFICE VISIT (OUTPATIENT)
Dept: FAMILY MEDICINE CLINIC | Facility: CLINIC | Age: 57
End: 2022-12-10

## 2022-12-10 VITALS
TEMPERATURE: 98.2 F | BODY MASS INDEX: 35.98 KG/M2 | HEIGHT: 78 IN | WEIGHT: 311 LBS | RESPIRATION RATE: 16 BRPM | SYSTOLIC BLOOD PRESSURE: 160 MMHG | HEART RATE: 87 BPM | OXYGEN SATURATION: 96 % | DIASTOLIC BLOOD PRESSURE: 80 MMHG

## 2022-12-10 DIAGNOSIS — R53.83 OTHER FATIGUE: ICD-10-CM

## 2022-12-10 DIAGNOSIS — I10 BENIGN ESSENTIAL HYPERTENSION: Primary | ICD-10-CM

## 2022-12-10 RX ORDER — TESTOSTERONE CYPIONATE 200 MG/ML
200 INJECTION INTRAMUSCULAR ONCE
COMMUNITY

## 2022-12-10 RX ORDER — ANASTROZOLE 1 MG/1
TABLET ORAL DAILY
COMMUNITY

## 2022-12-10 NOTE — PROGRESS NOTES
Chief Complaint   Patient presents with   • Blood Pressure Check     Discuss new medications, lab orders         Patient ID: Cata Russo is a 62 y o  male  HPI  Pt is seeing for f/u BP -  Off meds for last few months -  BP was in 110-120/70s until 2 m ago when started to use testosterone injectable and Arimidex " for body building purposes"  - BP at home in 130s/70s     The following portions of the patient's history were reviewed and updated as appropriate: allergies, current medications, past family history, past medical history, past social history, past surgical history and problem list     Review of Systems   Constitutional: Negative  Respiratory: Negative  Cardiovascular: Negative  Gastrointestinal: Negative  Genitourinary: Negative  Musculoskeletal: Negative  Skin: Negative  Neurological: Negative  Current Outpatient Medications   Medication Sig Dispense Refill   • anastrozole (ARIMIDEX) 1 mg tablet Take by mouth daily 1/2 tab weekly     • Dulera 200-5 MCG/ACT inhaler USE 2 INHALATIONS TWICE A DAY (RINSE MOUTH AFTER USE) 39 g 3   • testosterone cypionate (DEPO-TESTOSTERONE) 200 mg/mL SOLN Inject 200 mg into a muscle once     • albuterol (PROVENTIL HFA,VENTOLIN HFA) 90 mcg/act inhaler USE 2 INHALATIONS EVERY 4 HOURS AS NEEDED FOR WHEEZING (Patient not taking: Reported on 12/10/2022) 25 5 g 3     No current facility-administered medications for this visit  Objective:    /80 (BP Location: Left arm, Patient Position: Sitting, Cuff Size: Large)   Pulse 87   Temp 98 2 °F (36 8 °C)   Resp 16   Ht 6' 6" (1 981 m)   Wt (!) 141 kg (311 lb)   SpO2 96%   BMI 35 94 kg/m²        Physical Exam  Constitutional:       General: He is not in acute distress  Appearance: He is obese  He is not ill-appearing  Cardiovascular:      Rate and Rhythm: Normal rate and regular rhythm  Heart sounds: No murmur heard    Pulmonary:      Effort: Pulmonary effort is normal  No respiratory distress  Breath sounds: No wheezing, rhonchi or rales  Musculoskeletal:      Right lower leg: No edema  Left lower leg: No edema  Neurological:      General: No focal deficit present  Mental Status: He is alert and oriented to person, place, and time  Assessment/Plan:         Diagnoses and all orders for this visit:    Benign essential hypertension  -     Comprehensive metabolic panel; Future  -     Lipid panel; Future  -     TSH, 3rd generation; Future  Pt's BP was rechecked on his home BP monitor  -  The same   Other fatigue  -     CBC; Future  -     Testosterone; Future    Other orders  -     testosterone cypionate (DEPO-TESTOSTERONE) 200 mg/mL SOLN; Inject 200 mg into a muscle once  -     anastrozole (ARIMIDEX) 1 mg tablet;  Take by mouth daily 1/2 tab weekly            rto in 6 m               Raisa Whitman MD

## 2022-12-24 LAB
ALBUMIN SERPL-MCNC: 4.6 G/DL (ref 3.8–4.9)
ALBUMIN/GLOB SERPL: 2 {RATIO} (ref 1.2–2.2)
ALP SERPL-CCNC: 58 IU/L (ref 44–121)
ALT SERPL-CCNC: 33 IU/L (ref 0–44)
AST SERPL-CCNC: 39 IU/L (ref 0–40)
BASOPHILS # BLD AUTO: 0.1 X10E3/UL (ref 0–0.2)
BASOPHILS NFR BLD AUTO: 1 %
BILIRUB SERPL-MCNC: 0.4 MG/DL (ref 0–1.2)
BUN SERPL-MCNC: 18 MG/DL (ref 6–24)
BUN/CREAT SERPL: 13 (ref 9–20)
CALCIUM SERPL-MCNC: 9.3 MG/DL (ref 8.7–10.2)
CHLORIDE SERPL-SCNC: 100 MMOL/L (ref 96–106)
CHOLEST SERPL-MCNC: 145 MG/DL (ref 100–199)
CO2 SERPL-SCNC: 24 MMOL/L (ref 20–29)
CREAT SERPL-MCNC: 1.36 MG/DL (ref 0.76–1.27)
EGFR: 61 ML/MIN/1.73
EOSINOPHIL # BLD AUTO: 0.4 X10E3/UL (ref 0–0.4)
EOSINOPHIL NFR BLD AUTO: 4 %
ERYTHROCYTE [DISTWIDTH] IN BLOOD BY AUTOMATED COUNT: 12.2 % (ref 11.6–15.4)
GLOBULIN SER-MCNC: 2.3 G/DL (ref 1.5–4.5)
GLUCOSE SERPL-MCNC: 99 MG/DL (ref 70–99)
HCT VFR BLD AUTO: 52.6 % (ref 37.5–51)
HDLC SERPL-MCNC: 61 MG/DL
HGB BLD-MCNC: 18 G/DL (ref 13–17.7)
IMM GRANULOCYTES # BLD: 0.1 X10E3/UL (ref 0–0.1)
IMM GRANULOCYTES NFR BLD: 1 %
LDLC SERPL CALC-MCNC: 69 MG/DL (ref 0–99)
LYMPHOCYTES # BLD AUTO: 2.6 X10E3/UL (ref 0.7–3.1)
LYMPHOCYTES NFR BLD AUTO: 25 %
MCH RBC QN AUTO: 32.6 PG (ref 26.6–33)
MCHC RBC AUTO-ENTMCNC: 34.2 G/DL (ref 31.5–35.7)
MCV RBC AUTO: 95 FL (ref 79–97)
MICRODELETION SYND BLD/T FISH: NORMAL
MONOCYTES # BLD AUTO: 1.1 X10E3/UL (ref 0.1–0.9)
MONOCYTES NFR BLD AUTO: 11 %
NEUTROPHILS # BLD AUTO: 6 X10E3/UL (ref 1.4–7)
NEUTROPHILS NFR BLD AUTO: 58 %
PLATELET # BLD AUTO: 299 X10E3/UL (ref 150–450)
POTASSIUM SERPL-SCNC: 5.1 MMOL/L (ref 3.5–5.2)
PROT SERPL-MCNC: 6.9 G/DL (ref 6–8.5)
RBC # BLD AUTO: 5.52 X10E6/UL (ref 4.14–5.8)
SL AMB VLDL CHOLESTEROL CALC: 15 MG/DL (ref 5–40)
SODIUM SERPL-SCNC: 138 MMOL/L (ref 134–144)
TESTOST SERPL-MCNC: 1428 NG/DL (ref 264–916)
TRIGL SERPL-MCNC: 77 MG/DL (ref 0–149)
TSH SERPL DL<=0.005 MIU/L-ACNC: 2.4 UIU/ML (ref 0.45–4.5)
WBC # BLD AUTO: 10.1 X10E3/UL (ref 3.4–10.8)

## 2022-12-28 ENCOUNTER — OFFICE VISIT (OUTPATIENT)
Dept: FAMILY MEDICINE CLINIC | Facility: CLINIC | Age: 57
End: 2022-12-28

## 2022-12-28 VITALS
TEMPERATURE: 98.2 F | HEIGHT: 78 IN | WEIGHT: 315 LBS | SYSTOLIC BLOOD PRESSURE: 158 MMHG | RESPIRATION RATE: 16 BRPM | DIASTOLIC BLOOD PRESSURE: 80 MMHG | HEART RATE: 68 BPM | BODY MASS INDEX: 36.45 KG/M2

## 2022-12-28 DIAGNOSIS — T38.7X5A ADVERSE EFFECT OF TESTOSTERONE, INITIAL ENCOUNTER: ICD-10-CM

## 2022-12-28 DIAGNOSIS — D58.2 ELEVATED HEMOGLOBIN (HCC): ICD-10-CM

## 2022-12-28 DIAGNOSIS — I10 BENIGN ESSENTIAL HYPERTENSION: Primary | ICD-10-CM

## 2022-12-28 RX ORDER — AMLODIPINE BESYLATE 5 MG/1
5 TABLET ORAL DAILY
Qty: 90 TABLET | Refills: 3 | Status: SHIPPED | OUTPATIENT
Start: 2022-12-28

## 2022-12-28 NOTE — PROGRESS NOTES
Chief Complaint   Patient presents with   • Follow-up     Bp check , discuss results of labwork         Patient ID: Migel May is a 62 y o  male  HPI  Pt is seeing for f/u HTn -  BP is elevated at home in 130s-150s/80s  -  Injecting testosterone for body building purposes  -  Level was high -  Also found elevated Hg     The following portions of the patient's history were reviewed and updated as appropriate: allergies, current medications, past family history, past medical history, past social history, past surgical history and problem list     Review of Systems   Constitutional: Negative  Respiratory: Negative  Cardiovascular: Negative  Gastrointestinal: Negative  Genitourinary: Negative  Musculoskeletal: Negative  Skin: Negative  Neurological: Negative  Current Outpatient Medications   Medication Sig Dispense Refill   • anastrozole (ARIMIDEX) 1 mg tablet Take by mouth daily 1/2 tab weekly     • Dulera 200-5 MCG/ACT inhaler USE 2 INHALATIONS TWICE A DAY (RINSE MOUTH AFTER USE) 39 g 3   • testosterone cypionate (DEPO-TESTOSTERONE) 200 mg/mL SOLN Inject 200 mg into a muscle once     • albuterol (PROVENTIL HFA,VENTOLIN HFA) 90 mcg/act inhaler USE 2 INHALATIONS EVERY 4 HOURS AS NEEDED FOR WHEEZING (Patient not taking: Reported on 12/10/2022) 25 5 g 3     No current facility-administered medications for this visit  Objective:    /80 (BP Location: Left arm, Patient Position: Sitting, Cuff Size: Standard)   Pulse 68   Temp 98 2 °F (36 8 °C)   Resp 16   Ht 6' 6" (1 981 m)   Wt (!) 146 kg (321 lb 3 2 oz)   BMI 37 12 kg/m²        Physical Exam  Constitutional:       Appearance: He is not ill-appearing  Cardiovascular:      Rate and Rhythm: Normal rate  Pulmonary:      Effort: Pulmonary effort is normal  No respiratory distress  Neurological:      General: No focal deficit present  Mental Status: He is alert and oriented to person, place, and time  Psychiatric:         Mood and Affect: Mood normal            Labs in chart were reviewed  Assessment/Plan:         Diagnoses and all orders for this visit:    Benign essential hypertension  -     amLODIPine (NORVASC) 5 mg tablet; Take 1 tablet (5 mg total) by mouth daily    Elevated hemoglobin (HCC)             -   Due to testosterone use        -     CBC; Future    Adverse effect of testosterone, initial encounter  -     Testosterone;  Future        Was advised to stop using testosterone     rto in 2 m                    Rehana Santiago MD

## 2022-12-28 NOTE — PATIENT INSTRUCTIONS
Recent Results (from the past 672 hour(s))   Finn Anderson Default    Collection Time: 12/23/22  7:06 AM   Result Value Ref Range    White Blood Cell Count 10 1 3 4 - 10 8 x10E3/uL    Red Blood Cell Count 5 52 4 14 - 5 80 x10E6/uL    Hemoglobin 18 0 (H) 13 0 - 17 7 g/dL    HCT 52 6 (H) 37 5 - 51 0 %    MCV 95 79 - 97 fL    MCH 32 6 26 6 - 33 0 pg    MCHC 34 2 31 5 - 35 7 g/dL    RDW 12 2 11 6 - 15 4 %    Platelet Count 574 272 - 450 x10E3/uL    Neutrophils 58 Not Estab  %    Lymphocytes 25 Not Estab  %    Monocytes 11 Not Estab  %    Eosinophils 4 Not Estab  %    Basophils PCT 1 Not Estab  %    Neutrophils (Absolute) 6 0 1 4 - 7 0 x10E3/uL    Lymphocytes (Absolute) 2 6 0 7 - 3 1 x10E3/uL    Monocytes (Absolute) 1 1 (H) 0 1 - 0 9 x10E3/uL    Eosinophils (Absolute) 0 4 0 0 - 0 4 x10E3/uL    Basophils ABS 0 1 0 0 - 0 2 x10E3/uL    Immature Granulocytes 1 Not Estab  %    Immature Granulocytes (Absolute) 0 1 0 0 - 0 1 x10E3/uL   Comprehensive metabolic panel    Collection Time: 12/23/22  7:06 AM   Result Value Ref Range    Glucose, Random 99 70 - 99 mg/dL    BUN 18 6 - 24 mg/dL    Creatinine 1 36 (H) 0 76 - 1 27 mg/dL    eGFR 61 >59 mL/min/1 73    SL AMB BUN/CREATININE RATIO 13 9 - 20    Sodium 138 134 - 144 mmol/L    Potassium 5 1 3 5 - 5 2 mmol/L    Chloride 100 96 - 106 mmol/L    CO2 24 20 - 29 mmol/L    CALCIUM 9 3 8 7 - 10 2 mg/dL    Protein, Total 6 9 6 0 - 8 5 g/dL    Albumin 4 6 3 8 - 4 9 g/dL    Globulin, Total 2 3 1 5 - 4 5 g/dL    Albumin/Globulin Ratio 2 0 1 2 - 2 2    TOTAL BILIRUBIN 0 4 0 0 - 1 2 mg/dL    Alk Phos Isoenzymes 58 44 - 121 IU/L    AST 39 0 - 40 IU/L    ALT 33 0 - 44 IU/L   Lipid panel    Collection Time: 12/23/22  7:06 AM   Result Value Ref Range    Cholesterol, Total 145 100 - 199 mg/dL    Triglycerides 77 0 - 149 mg/dL    HDL 61 >39 mg/dL    VLDL Cholesterol Calculated 15 5 - 40 mg/dL    LDL Calculated 69 0 - 99 mg/dL   Testosterone    Collection Time: 12/23/22  7:06 AM   Result Value Ref Range    TESTOSTERONE TOTAL 1,428 (H) 264 - 916 ng/dL   TSH, 3rd generation    Collection Time: 12/23/22  7:06 AM   Result Value Ref Range    TSH 2 400 0 450 - 4 500 uIU/mL   Cardiovascular Report    Collection Time: 12/23/22  7:06 AM   Result Value Ref Range    Interpretation Note

## 2023-08-29 DIAGNOSIS — J45.20 MILD INTERMITTENT ASTHMA WITHOUT COMPLICATION: ICD-10-CM

## 2023-08-29 RX ORDER — MOMETASONE FUROATE AND FORMOTEROL FUMARATE DIHYDRATE 200; 5 UG/1; UG/1
AEROSOL RESPIRATORY (INHALATION)
Qty: 39 G | Refills: 3 | Status: SHIPPED | OUTPATIENT
Start: 2023-08-29

## 2023-08-29 RX ORDER — ALBUTEROL SULFATE 90 UG/1
AEROSOL, METERED RESPIRATORY (INHALATION)
Qty: 25.5 G | Refills: 3 | Status: SHIPPED | OUTPATIENT
Start: 2023-08-29

## 2023-12-20 DIAGNOSIS — I10 BENIGN ESSENTIAL HYPERTENSION: ICD-10-CM

## 2023-12-20 RX ORDER — AMLODIPINE BESYLATE 5 MG/1
5 TABLET ORAL DAILY
Qty: 90 TABLET | Refills: 0 | Status: SHIPPED | OUTPATIENT
Start: 2023-12-20

## 2023-12-20 NOTE — TELEPHONE ENCOUNTER
Patient needs an appointment. Please contact the patient to schedule an appointment. Courtesy refill provided.

## 2024-03-15 DIAGNOSIS — I10 BENIGN ESSENTIAL HYPERTENSION: ICD-10-CM

## 2024-03-15 RX ORDER — AMLODIPINE BESYLATE 5 MG/1
5 TABLET ORAL DAILY
Qty: 90 TABLET | Refills: 0 | Status: SHIPPED | OUTPATIENT
Start: 2024-03-15

## 2024-03-15 NOTE — TELEPHONE ENCOUNTER
Reason for call:   [x] Refill   [] Prior Auth  [] Other:     Office:   [x] PCP/Provider -   [] Specialty/Provider -     Medication: amLODIPine (NORVASC) 5 mg tablet     Dose/Frequency:   TAKE 1 TABLET DAILY        Quantity: #90    Pharmacy: [facility] - [address] [phone]     Does the patient have enough for 3 days?   [x] Yes   [] No - Send as HP to POD

## 2024-04-10 ENCOUNTER — OFFICE VISIT (OUTPATIENT)
Dept: FAMILY MEDICINE CLINIC | Facility: CLINIC | Age: 59
End: 2024-04-10
Payer: COMMERCIAL

## 2024-04-10 VITALS
OXYGEN SATURATION: 96 % | TEMPERATURE: 98.3 F | HEIGHT: 78 IN | HEART RATE: 84 BPM | RESPIRATION RATE: 18 BRPM | SYSTOLIC BLOOD PRESSURE: 150 MMHG | WEIGHT: 315 LBS | BODY MASS INDEX: 36.45 KG/M2 | DIASTOLIC BLOOD PRESSURE: 80 MMHG

## 2024-04-10 DIAGNOSIS — Z12.12 ENCOUNTER FOR COLORECTAL CANCER SCREENING: ICD-10-CM

## 2024-04-10 DIAGNOSIS — I10 BENIGN ESSENTIAL HYPERTENSION: ICD-10-CM

## 2024-04-10 DIAGNOSIS — J45.30 MILD PERSISTENT ASTHMA WITHOUT COMPLICATION: ICD-10-CM

## 2024-04-10 DIAGNOSIS — Z12.11 ENCOUNTER FOR COLORECTAL CANCER SCREENING: ICD-10-CM

## 2024-04-10 DIAGNOSIS — N52.9 ERECTILE DYSFUNCTION, UNSPECIFIED ERECTILE DYSFUNCTION TYPE: ICD-10-CM

## 2024-04-10 DIAGNOSIS — R79.89 ABNORMAL SERUM TESTOSTERONE LEVEL: ICD-10-CM

## 2024-04-10 DIAGNOSIS — Z00.00 ANNUAL PHYSICAL EXAM: Primary | ICD-10-CM

## 2024-04-10 PROBLEM — G89.4 CHRONIC PAIN SYNDROME: Status: RESOLVED | Noted: 2021-03-12 | Resolved: 2024-04-10

## 2024-04-10 PROBLEM — M25.511 RIGHT SHOULDER PAIN: Status: RESOLVED | Noted: 2021-03-12 | Resolved: 2024-04-10

## 2024-04-10 PROBLEM — G56.91 NEUROPATHY OF RIGHT UPPER EXTREMITY: Status: RESOLVED | Noted: 2021-03-12 | Resolved: 2024-04-10

## 2024-04-10 PROCEDURE — 99396 PREV VISIT EST AGE 40-64: CPT | Performed by: FAMILY MEDICINE

## 2024-04-10 RX ORDER — SILDENAFIL 100 MG/1
100 TABLET, FILM COATED ORAL DAILY PRN
Qty: 18 TABLET | Refills: 3 | Status: SHIPPED | OUTPATIENT
Start: 2024-04-10 | End: 2024-10-07

## 2024-04-10 RX ORDER — LOSARTAN POTASSIUM AND HYDROCHLOROTHIAZIDE 12.5; 1 MG/1; MG/1
1 TABLET ORAL DAILY
Qty: 90 TABLET | Refills: 3 | Status: SHIPPED | OUTPATIENT
Start: 2024-04-10

## 2024-04-10 RX ORDER — AMLODIPINE BESYLATE 5 MG/1
5 TABLET ORAL DAILY
Qty: 90 TABLET | Refills: 3 | Status: SHIPPED | OUTPATIENT
Start: 2024-04-10

## 2024-04-10 NOTE — PROGRESS NOTES
Chief Complaint   Patient presents with   • Physical Exam        Patient ID: Skip Lackey Sr. is a 58 y.o. male.    HPI  Pt is seeing for annual PE-  has HTN -  normal BP at home -  restarted Losartan HCT 2 m ago when BP increased to 150s  -  cont with Amlodipine -  BP went back to normal  -  cont to use injectable testosterone and donate blood frequently     The following portions of the patient's history were reviewed and updated as appropriate: allergies, current medications, past family history, past medical history, past social history, past surgical history and problem list.    Review of Systems   Constitutional: Negative.  Negative for fatigue, fever and unexpected weight change.   HENT: Negative.  Negative for congestion, ear discharge, ear pain, hearing loss, rhinorrhea, sinus pressure, sore throat and trouble swallowing.    Eyes: Negative.    Respiratory: Negative.     Cardiovascular: Negative.    Gastrointestinal: Negative.    Endocrine: Negative.    Genitourinary: Negative.         Except for ED issues    Musculoskeletal: Negative.    Skin: Negative.    Allergic/Immunologic: Negative.    Neurological: Negative.  Negative for dizziness, weakness, light-headedness and numbness.   Hematological: Negative.    Psychiatric/Behavioral: Negative.         Current Outpatient Medications   Medication Sig Dispense Refill   • albuterol (PROVENTIL HFA,VENTOLIN HFA) 90 mcg/act inhaler USE 2 INHALATIONS EVERY 4 HOURS AS NEEDED FOR WHEEZING 25.5 g 3   • amLODIPine (NORVASC) 5 mg tablet Take 1 tablet (5 mg total) by mouth daily 90 tablet 3   • anastrozole (ARIMIDEX) 1 mg tablet Take by mouth daily 1/2 tab weekly     • Dulera 200-5 MCG/ACT inhaler USE 2 INHALATIONS TWICE A DAY (RINSE MOUTH AFTER USE) 39 g 3   • losartan-hydrochlorothiazide (HYZAAR) 100-12.5 MG per tablet Take 1 tablet by mouth daily 90 tablet 3   • sildenafil (VIAGRA) 100 mg tablet Take 1 tablet (100 mg total) by mouth daily as needed for erectile  "dysfunction 18 tablet 3   • testosterone cypionate (DEPO-TESTOSTERONE) 200 mg/mL SOLN Inject 200 mg into a muscle once       No current facility-administered medications for this visit.       Objective:    /80 Comment: by MD  Pulse 84   Temp 98.3 °F (36.8 °C) (Temporal)   Resp 18   Ht 6' 6\" (1.981 m)   Wt (!) 146 kg (321 lb 3.2 oz)   SpO2 96%   BMI 37.12 kg/m²        Physical Exam  Constitutional:       General: He is not in acute distress.     Appearance: Normal appearance. He is well-developed. He is not ill-appearing.   HENT:      Head: Normocephalic and atraumatic.      Right Ear: Hearing, tympanic membrane, ear canal and external ear normal.      Left Ear: Hearing, tympanic membrane, ear canal and external ear normal.      Nose: No congestion or rhinorrhea.      Mouth/Throat:      Pharynx: No oropharyngeal exudate or posterior oropharyngeal erythema.   Eyes:      Extraocular Movements: Extraocular movements intact.      Conjunctiva/sclera: Conjunctivae normal.   Neck:      Thyroid: No thyroid mass or thyromegaly.      Vascular: No JVD.   Cardiovascular:      Rate and Rhythm: Normal rate and regular rhythm.      Heart sounds: Normal heart sounds. No murmur heard.     No gallop.   Pulmonary:      Effort: No respiratory distress.      Breath sounds: Normal breath sounds. No wheezing, rhonchi or rales.   Abdominal:      Palpations: Abdomen is soft.      Tenderness: There is no abdominal tenderness. There is no right CVA tenderness or left CVA tenderness.   Musculoskeletal:         General: No swelling or tenderness.      Cervical back: Normal range of motion and neck supple. No rigidity or tenderness.      Right lower leg: No edema.      Left lower leg: No edema.   Lymphadenopathy:      Cervical: No cervical adenopathy.   Skin:     Coloration: Skin is not pale.      Findings: No rash.   Neurological:      General: No focal deficit present.      Mental Status: He is alert and oriented to person, place, " and time.      Cranial Nerves: No cranial nerve deficit.      Motor: No weakness.      Gait: Gait normal.   Psychiatric:         Mood and Affect: Mood normal.         Behavior: Behavior normal.         Thought Content: Thought content normal.         Judgment: Judgment normal.           Labs in chart were reviewed.      Assessment/Plan:         Diagnoses and all orders for this visit:    Annual physical exam  -     CBC; Future  -     Comprehensive metabolic panel; Future  -     Lipid panel; Future  -     TSH, 3rd generation; Future  -     Hemoglobin A1C; Future    Encounter for colorectal cancer screening  -     Ambulatory Referral to Gastroenterology; Future    Benign essential hypertension  -     losartan-hydrochlorothiazide (HYZAAR) 100-12.5 MG per tablet; Take 1 tablet by mouth daily  -     amLODIPine (NORVASC) 5 mg tablet; Take 1 tablet (5 mg total) by mouth daily  Normal BP at home   Mild persistent asthma without complication    Abnormal serum testosterone level  -     Testosterone; Future    Erectile dysfunction, unspecified erectile dysfunction type  -     sildenafil (VIAGRA) 100 mg tablet; Take 1 tablet (100 mg total) by mouth daily as needed for erectile dysfunction      Was advised to stop Testosterone injections     Declined Adacel and shingles vaccines     Rto in 1 y                   Paula Wallace MD

## 2024-08-23 DIAGNOSIS — J45.20 MILD INTERMITTENT ASTHMA WITHOUT COMPLICATION: ICD-10-CM

## 2024-08-23 RX ORDER — MOMETASONE FUROATE AND FORMOTEROL FUMARATE DIHYDRATE 200; 5 UG/1; UG/1
AEROSOL RESPIRATORY (INHALATION)
Qty: 39 G | Refills: 1 | Status: SHIPPED | OUTPATIENT
Start: 2024-08-23

## 2024-08-23 RX ORDER — ALBUTEROL SULFATE 90 UG/1
AEROSOL, METERED RESPIRATORY (INHALATION)
Qty: 25.5 G | Refills: 1 | Status: SHIPPED | OUTPATIENT
Start: 2024-08-23

## 2025-02-19 DIAGNOSIS — J45.20 MILD INTERMITTENT ASTHMA WITHOUT COMPLICATION: ICD-10-CM

## 2025-02-20 RX ORDER — MOMETASONE FUROATE AND FORMOTEROL FUMARATE DIHYDRATE 200; 5 UG/1; UG/1
2 AEROSOL RESPIRATORY (INHALATION) 2 TIMES DAILY
Qty: 39 G | Refills: 3 | Status: SHIPPED | OUTPATIENT
Start: 2025-02-20

## 2025-02-20 RX ORDER — ALBUTEROL SULFATE 90 UG/1
INHALANT RESPIRATORY (INHALATION)
Qty: 25.5 G | Refills: 3 | Status: SHIPPED | OUTPATIENT
Start: 2025-02-20

## 2025-02-20 NOTE — TELEPHONE ENCOUNTER
Refill must be reviewed and completed by the office or provider. The refill is unable to be approved or denied by the medication management team.    Patient need and appt, last seen x10M - Please review to see if the refill is appropriate.

## 2025-04-30 DIAGNOSIS — I10 BENIGN ESSENTIAL HYPERTENSION: ICD-10-CM

## 2025-04-30 RX ORDER — LOSARTAN POTASSIUM AND HYDROCHLOROTHIAZIDE 12.5; 1 MG/1; MG/1
1 TABLET ORAL DAILY
Qty: 90 TABLET | Refills: 0 | Status: SHIPPED | OUTPATIENT
Start: 2025-04-30

## 2025-05-08 DIAGNOSIS — I10 BENIGN ESSENTIAL HYPERTENSION: ICD-10-CM

## 2025-05-09 RX ORDER — AMLODIPINE BESYLATE 5 MG/1
5 TABLET ORAL DAILY
Qty: 90 TABLET | Refills: 0 | Status: SHIPPED | OUTPATIENT
Start: 2025-05-09

## 2025-05-28 ENCOUNTER — OFFICE VISIT (OUTPATIENT)
Dept: FAMILY MEDICINE CLINIC | Facility: CLINIC | Age: 60
End: 2025-05-28
Payer: COMMERCIAL

## 2025-05-28 VITALS
BODY MASS INDEX: 37.19 KG/M2 | SYSTOLIC BLOOD PRESSURE: 152 MMHG | HEART RATE: 78 BPM | HEIGHT: 77 IN | TEMPERATURE: 99.3 F | RESPIRATION RATE: 14 BRPM | DIASTOLIC BLOOD PRESSURE: 70 MMHG | OXYGEN SATURATION: 96 % | WEIGHT: 315 LBS

## 2025-05-28 DIAGNOSIS — N52.9 ERECTILE DYSFUNCTION, UNSPECIFIED ERECTILE DYSFUNCTION TYPE: ICD-10-CM

## 2025-05-28 DIAGNOSIS — M25.562 CHRONIC PAIN OF BOTH KNEES: ICD-10-CM

## 2025-05-28 DIAGNOSIS — J45.30 MILD PERSISTENT ASTHMA WITHOUT COMPLICATION: ICD-10-CM

## 2025-05-28 DIAGNOSIS — I10 BENIGN ESSENTIAL HYPERTENSION: ICD-10-CM

## 2025-05-28 DIAGNOSIS — Z00.00 ANNUAL PHYSICAL EXAM: Primary | ICD-10-CM

## 2025-05-28 DIAGNOSIS — M25.561 CHRONIC PAIN OF BOTH KNEES: ICD-10-CM

## 2025-05-28 DIAGNOSIS — G89.29 CHRONIC PAIN OF BOTH KNEES: ICD-10-CM

## 2025-05-28 PROCEDURE — 99396 PREV VISIT EST AGE 40-64: CPT | Performed by: FAMILY MEDICINE

## 2025-05-28 RX ORDER — NEBIVOLOL 10 MG/1
10 TABLET ORAL DAILY
Qty: 90 TABLET | Refills: 3 | Status: SHIPPED | OUTPATIENT
Start: 2025-05-28

## 2025-05-28 RX ORDER — AMLODIPINE BESYLATE 10 MG/1
10 TABLET ORAL DAILY
Qty: 90 TABLET | Refills: 1 | Status: SHIPPED | OUTPATIENT
Start: 2025-05-28

## 2025-05-28 RX ORDER — SILDENAFIL 100 MG/1
100 TABLET, FILM COATED ORAL DAILY PRN
Qty: 18 TABLET | Refills: 3 | Status: SHIPPED | OUTPATIENT
Start: 2025-05-28 | End: 2025-11-24

## 2025-05-28 NOTE — PROGRESS NOTES
Adult Annual Physical  Name: Skip Lackey SrLeann      : 1965      MRN: [id number]  Encounter Provider: Paula Wallace MD  Encounter Date: 2025   Encounter department: [facility]    :  Assessment & Plan  Annual physical exam    Orders:  •  CBC; Future  •  Comprehensive metabolic panel; Future  •  Lipid panel; Future  •  TSH, 3rd generation; Future  •  Hemoglobin A1C With EAG; Future    Benign essential hypertension    Orders:  •  amLODIPine (NORVASC) 10 mg tablet; Take 1 tablet (10 mg total) by mouth daily  •  nebivolol (BYSTOLIC) 10 mg tablet; Take 1 tablet (10 mg total) by mouth daily    Mild persistent asthma without complication         Erectile dysfunction, unspecified erectile dysfunction type    Orders:  •  sildenafil (VIAGRA) 100 mg tablet; Take 1 tablet (100 mg total) by mouth daily as needed for erectile dysfunction    Chronic pain of both knees    Orders:  •  Ambulatory Referral to Orthopedic Surgery; Future  •  XR knee 3 vw right non injury; Future  •  XR knee 3 vw left non injury; Future        Preventive Screenings:  - Diabetes Screening: orders placed  - Cholesterol Screening: orders placed   - Hepatitis C screening: screening not indicated   - HIV screening: screening not indicated   - Lung cancer screening: screening not indicated   - Prostate cancer screening: patient declines     Immunizations:  - Immunizations due: Prevnar 20, Tdap and Zoster (Shingrix)  - The patient declines recommended vaccines currently despite my recommendations        Depression Screening and Follow-up Plan: Patient was screened for depression during today's encounter. They screened negative with a PHQ-2 score of 0.          History of Present Illness     Adult Annual Physical:  Patient presents for annual physical. Pt is seeing for annual PE -  cont to use illegal testosterone injection .     Diet and Physical Activity:  - Diet/Nutrition: well balanced diet, portion control, limited  "junk food and low fat diet.  - Exercise: strength training exercises and 5-7 times a week on average.    Depression Screening:  - PHQ-2 Score: 0    General Health:  - Sleep: sleeps well and 7-8 hours of sleep on average.  - Hearing: normal hearing bilateral ears.  - Vision: vision problems. uses readers  - Dental: regular dental visits, brushes teeth twice daily and floss regularly.     Health:  - History of STDs: no.   - Urinary symptoms: none.     Review of Systems   Constitutional:  Negative for fatigue, fever and unexpected weight change.   HENT:  Negative for congestion, ear discharge, ear pain, hearing loss, rhinorrhea, sinus pressure, sore throat and trouble swallowing.    Eyes: Negative.    Respiratory: Negative.     Cardiovascular: Negative.    Gastrointestinal: Negative.    Endocrine: Negative.    Genitourinary: Negative.    Musculoskeletal:  Positive for arthralgias (both knees). Negative for back pain, gait problem and joint swelling.   Skin: Negative.    Neurological:  Negative for dizziness, weakness, light-headedness and numbness.   Hematological: Negative.    Psychiatric/Behavioral: Negative.           Objective   /70 (BP Location: Left arm, Patient Position: Sitting, Cuff Size: Adult)   Pulse 78   Temp 99.3 °F (37.4 °C) (Tympanic)   Resp 14   Ht 6' 5\" (1.956 m)   Wt (!) 145 kg (319 lb)   SpO2 96%   BMI 37.83 kg/m²     Physical Exam  Constitutional:       General: He is not in acute distress.     Appearance: Normal appearance. He is well-developed. He is obese. He is not ill-appearing.   HENT:      Head: Normocephalic and atraumatic.      Right Ear: Hearing, tympanic membrane, ear canal and external ear normal.      Left Ear: Hearing, tympanic membrane, ear canal and external ear normal.      Nose: No congestion or rhinorrhea.      Mouth/Throat:      Pharynx: No oropharyngeal exudate or posterior oropharyngeal erythema.     Eyes:      Extraocular Movements: Extraocular movements " intact.      Conjunctiva/sclera: Conjunctivae normal.     Neck:      Thyroid: No thyroid mass or thyromegaly.      Vascular: No JVD.     Cardiovascular:      Rate and Rhythm: Normal rate and regular rhythm.      Heart sounds: Normal heart sounds. No murmur heard.     No gallop.   Pulmonary:      Effort: No respiratory distress.      Breath sounds: Normal breath sounds. No wheezing, rhonchi or rales.   Abdominal:      Palpations: Abdomen is soft.      Tenderness: There is no abdominal tenderness. There is no right CVA tenderness or left CVA tenderness.     Musculoskeletal:         General: No swelling or tenderness.      Cervical back: Normal range of motion and neck supple. No rigidity or tenderness.      Right lower leg: No edema.      Left lower leg: No edema.   Lymphadenopathy:      Cervical: No cervical adenopathy.     Skin:     Coloration: Skin is not pale.      Findings: No rash.     Neurological:      Mental Status: He is alert and oriented to person, place, and time.      Cranial Nerves: No cranial nerve deficit.      Motor: No weakness.      Gait: Gait normal.     Psychiatric:         Mood and Affect: Mood normal.         Behavior: Behavior normal.         Thought Content: Thought content normal.         Judgment: Judgment normal.

## 2025-05-28 NOTE — PATIENT INSTRUCTIONS
"Patient Education     Routine physical for adults   The Basics   Written by the doctors and editors at [facility]   What is a physical? -- A physical is a routine visit, or \"check-up,\" with your doctor. You might also hear it called a \"wellness visit\" or \"preventive visit.\"  During each visit, the doctor will:   Ask about your physical and mental health   Ask about your habits, behaviors, and lifestyle   Do an exam   Give you vaccines if needed   Talk to you about any medicines you take   Give advice about your health   Answer your questions  Getting regular check-ups is an important part of taking care of your health. It can help your doctor find and treat any problems you have. But it's also important for preventing health problems.  A routine physical is different from a \"sick visit.\" A sick visit is when you see a doctor because of a health concern or problem. Since physicals are scheduled ahead of time, you can think about what you want to ask the doctor.  How often should I get a physical? -- It depends on your age and health. In general, for people age 21 years and older:   If you are younger than 50 years, you might be able to get a physical every 3 years.   If you are 50 years or older, your doctor might recommend a physical every year.  If you have an ongoing health condition, like diabetes or high blood pressure, your doctor will probably want to see you more often.  What happens during a physical? -- In general, each visit will include:   Physical exam - The doctor or nurse will check your height, weight, heart rate, and blood pressure. They will also look at your eyes and ears. They will ask about how you are feeling and whether you have any symptoms that bother you.   Medicines - It's a good idea to bring a list of all the medicines you take to each doctor visit. Your doctor will talk to you about your medicines and answer any questions. Tell them if you are having any side effects that bother you. You " "should also tell them if you are having trouble paying for any of your medicines.   Habits and behaviors - This includes:   Your diet   Your exercise habits   Whether you smoke, drink alcohol, or use drugs   Whether you are sexually active   Whether you feel safe at home  Your doctor will talk to you about things you can do to improve your health and lower your risk of health problems. They will also offer help and support. For example, if you want to quit smoking, they can give you advice and might prescribe medicines. If you want to improve your diet or get more physical activity, they can help you with this, too.   Lab tests, if needed - The tests you get will depend on your age and situation. For example, your doctor might want to check your:   Cholesterol   Blood sugar   Iron level   Vaccines - The recommended vaccines will depend on your age, health, and what vaccines you already had. Vaccines are very important because they can prevent certain serious or deadly infections.   Discussion of screening - \"Screening\" means checking for diseases or other health problems before they cause symptoms. Your doctor can recommend screening based on your age, risk, and preferences. This might include tests to check for:   Cancer, such as breast, prostate, cervical, ovarian, colorectal, prostate, lung, or skin cancer   Sexually transmitted infections, such as chlamydia and gonorrhea   Mental health conditions like depression and anxiety  Your doctor will talk to you about the different types of screening tests. They can help you decide which screenings to have. They can also explain what the results might mean.   Answering questions - The physical is a good time to ask the doctor or nurse questions about your health. If needed, they can refer you to other doctors or specialists, too.  Adults older than 65 years often need other care, too. As you get older, your doctor will talk to you about:   How to prevent falling at " home   Hearing or vision tests   Memory testing   How to take your medicines safely   Making sure that you have the help and support you need at home  All topics are updated as new evidence becomes available and our peer review process is complete.  This topic retrieved from [employer] on: May 02, 2024.  Topic 042344 Version 1.0  Release: 32.4.3 - C32.122  © 2024 UpToDate, Inc. and/or its affiliates. All rights reserved.  Consumer Information Use and Disclaimer   Disclaimer: This generalized information is a limited summary of diagnosis, treatment, and/or medication information. It is not meant to be comprehensive and should be used as a tool to help the user understand and/or assess potential diagnostic and treatment options. It does NOT include all information about conditions, treatments, medications, side effects, or risks that may apply to a specific patient. It is not intended to be medical advice or a substitute for the medical advice, diagnosis, or treatment of a health care provider based on the health care provider's examination and assessment of a patient's specific and unique circumstances. Patients must speak with a health care provider for complete information about their health, medical questions, and treatment options, including any risks or benefits regarding use of medications. This information does not endorse any treatments or medications as safe, effective, or approved for treating a specific patient. UpToDate, Inc. and its affiliates disclaim any warranty or liability relating to this information or the use thereof.The use of this information is governed by the Terms of Use, available at https://www.[employer].com/en/know/clinical-effectiveness-terms. 2024© UpToDate, Inc. and its affiliates and/or licensors. All rights reserved.  Copyright   © 2024 UpToDate, Inc. and/or its affiliates. All rights reserved.

## 2025-05-28 NOTE — ASSESSMENT & PLAN NOTE
Orders:  •  amLODIPine (NORVASC) 10 mg tablet; Take 1 tablet (10 mg total) by mouth daily  •  nebivolol (BYSTOLIC) 10 mg tablet; Take 1 tablet (10 mg total) by mouth daily

## 2025-05-30 ENCOUNTER — TELEPHONE (OUTPATIENT)
Age: 60
End: 2025-05-30

## 2025-05-30 DIAGNOSIS — Z79.890 LONG-TERM CURRENT USE OF TESTOSTERONE CYPIONATE: Primary | ICD-10-CM

## 2025-05-30 NOTE — TELEPHONE ENCOUNTER
Patient called asking if PCP can place a lab order to check his testosterone levels. If so please notify the patient.

## 2025-06-01 LAB
ALBUMIN SERPL-MCNC: 4.5 G/DL (ref 3.6–5.1)
ALBUMIN/GLOB SERPL: 1.9 (CALC) (ref 1–2.5)
ALP SERPL-CCNC: 41 U/L (ref 35–144)
ALT SERPL-CCNC: 91 U/L (ref 9–46)
AST SERPL-CCNC: 66 U/L (ref 10–35)
BILIRUB SERPL-MCNC: 0.8 MG/DL (ref 0.2–1.2)
BUN SERPL-MCNC: 27 MG/DL (ref 7–25)
BUN/CREAT SERPL: 17 (CALC) (ref 6–22)
CALCIUM SERPL-MCNC: 9.8 MG/DL (ref 8.6–10.3)
CHLORIDE SERPL-SCNC: 101 MMOL/L (ref 98–110)
CHOLEST SERPL-MCNC: 167 MG/DL
CHOLEST/HDLC SERPL: 2.6 (CALC)
CO2 SERPL-SCNC: 30 MMOL/L (ref 20–32)
CREAT SERPL-MCNC: 1.56 MG/DL (ref 0.7–1.3)
ERYTHROCYTE [DISTWIDTH] IN BLOOD BY AUTOMATED COUNT: 14 % (ref 11–15)
EST. AVERAGE GLUCOSE BLD GHB EST-MCNC: 103 MG/DL
EST. AVERAGE GLUCOSE BLD GHB EST-SCNC: 5.7 MMOL/L
GFR/BSA.PRED SERPLBLD CYS-BASED-ARV: 51 ML/MIN/1.73M2
GLOBULIN SER CALC-MCNC: 2.4 G/DL (CALC) (ref 1.9–3.7)
GLUCOSE SERPL-MCNC: 103 MG/DL (ref 65–139)
HBA1C MFR BLD: 5.2 %
HCT VFR BLD AUTO: 52.6 % (ref 38.5–50)
HDLC SERPL-MCNC: 65 MG/DL
HGB BLD-MCNC: 17.6 G/DL (ref 13.2–17.1)
LDLC SERPL CALC-MCNC: 85 MG/DL (CALC)
MCH RBC QN AUTO: 32.9 PG (ref 27–33)
MCHC RBC AUTO-ENTMCNC: 33.5 G/DL (ref 32–36)
MCV RBC AUTO: 98.3 FL (ref 80–100)
NONHDLC SERPL-MCNC: 102 MG/DL (CALC)
PLATELET # BLD AUTO: 226 THOUSAND/UL (ref 140–400)
PMV BLD REES-ECKER: 10 FL (ref 7.5–12.5)
POTASSIUM SERPL-SCNC: 4.7 MMOL/L (ref 3.5–5.3)
PROT SERPL-MCNC: 6.9 G/DL (ref 6.1–8.1)
RBC # BLD AUTO: 5.35 MILLION/UL (ref 4.2–5.8)
SODIUM SERPL-SCNC: 139 MMOL/L (ref 135–146)
TESTOST SERPL-MCNC: 1047 NG/DL (ref 250–827)
TRIGL SERPL-MCNC: 82 MG/DL
TSH SERPL-ACNC: 2.65 MIU/L (ref 0.4–4.5)
WBC # BLD AUTO: 7.2 THOUSAND/UL (ref 3.8–10.8)

## 2025-06-11 ENCOUNTER — OFFICE VISIT (OUTPATIENT)
Dept: FAMILY MEDICINE CLINIC | Facility: CLINIC | Age: 60
End: 2025-06-11
Payer: COMMERCIAL

## 2025-06-11 VITALS
DIASTOLIC BLOOD PRESSURE: 82 MMHG | HEART RATE: 58 BPM | HEIGHT: 77 IN | SYSTOLIC BLOOD PRESSURE: 140 MMHG | OXYGEN SATURATION: 95 % | WEIGHT: 315 LBS | BODY MASS INDEX: 37.19 KG/M2 | RESPIRATION RATE: 14 BRPM | TEMPERATURE: 98.3 F

## 2025-06-11 DIAGNOSIS — R79.89 ELEVATED SERUM CREATININE: ICD-10-CM

## 2025-06-11 DIAGNOSIS — R74.8 ABNORMAL LIVER ENZYMES: Primary | ICD-10-CM

## 2025-06-11 PROCEDURE — 99214 OFFICE O/P EST MOD 30 MIN: CPT | Performed by: FAMILY MEDICINE

## 2025-06-11 NOTE — PATIENT INSTRUCTIONS
Recent Results (from the past 4 weeks)   Testosterone    Collection Time: 05/31/25  9:51 AM   Result Value Ref Range    TESTOSTERONE TOTAL 1,047 (H) 250 - 827 ng/dL   Lipid panel    Collection Time: 05/31/25  9:51 AM   Result Value Ref Range    Total Cholesterol 167 <200 mg/dL    HDL 65 > OR = 40 mg/dL    Triglycerides 82 <150 mg/dL    LDL Calculated 85 mg/dL (calc)    Chol HDLC Ratio 2.6 <5.0 (calc)    Non-HDL Cholesterol 102 <130 mg/dL (calc)   Comprehensive metabolic panel    Collection Time: 05/31/25  9:51 AM   Result Value Ref Range    Glucose, Random 103 65 - 139 mg/dL    BUN 27 (H) 7 - 25 mg/dL    Creatinine 1.56 (H) 0.70 - 1.30 mg/dL    eGFR 51 (L) > OR = 60 mL/min/1.73m2    SL AMB BUN/CREATININE RATIO 17 6 - 22 (calc)    Sodium 139 135 - 146 mmol/L    Potassium 4.7 3.5 - 5.3 mmol/L    Chloride 101 98 - 110 mmol/L    CO2 30 20 - 32 mmol/L    Calcium 9.8 8.6 - 10.3 mg/dL    Protein, Total 6.9 6.1 - 8.1 g/dL    Albumin 4.5 3.6 - 5.1 g/dL    Globulin 2.4 1.9 - 3.7 g/dL (calc)    Albumin/Globulin Ratio 1.9 1.0 - 2.5 (calc)    TOTAL BILIRUBIN 0.8 0.2 - 1.2 mg/dL    Alkaline Phosphatase 41 35 - 144 U/L    AST 66 (H) 10 - 35 U/L    ALT 91 (H) 9 - 46 U/L   CBC    Collection Time: 05/31/25  9:51 AM   Result Value Ref Range    White Blood Cell Count 7.2 3.8 - 10.8 Thousand/uL    Red Blood Cell Count 5.35 4.20 - 5.80 Million/uL    Hemoglobin 17.6 (H) 13.2 - 17.1 g/dL    HCT 52.6 (H) 38.5 - 50.0 %    MCV 98.3 80.0 - 100.0 fL    MCH 32.9 27.0 - 33.0 pg    MCHC 33.5 32.0 - 36.0 g/dL    RDW 14.0 11.0 - 15.0 %    Platelet Count 226 140 - 400 Thousand/uL    SL AMB MPV 10.0 7.5 - 12.5 fL   TSH, 3rd generation    Collection Time: 05/31/25  9:51 AM   Result Value Ref Range    TSH 2.65 0.40 - 4.50 mIU/L   Hemoglobin A1C With EAG    Collection Time: 05/31/25  9:51 AM   Result Value Ref Range    Hemoglobin A1C 5.2 <5.7 %    Estimated Average Glucose 103 mg/dL    Estimated Average Glucose (mmol/L) 5.7 mmol/L

## 2025-06-11 NOTE — PROGRESS NOTES
"Name: Skip Lackey Sr.      : 1965      MRN: [id number]  Encounter Provider: Paula Wallace MD  Encounter Date: 2025   Encounter department: [facility]    Assessment & Plan  Abnormal liver enzymes  Was advised to stop alcohol   Orders:  •  US right upper quadrant; Future  •  Comprehensive metabolic panel; Future    Elevated serum creatinine  Was advised to drink more water             History of Present Illness     Pt is seeing for f/u labs -  elev Hg, LFTs, Creatinine -  pt is injecting testosterone w/o Rx        Review of Systems   Constitutional: Negative.    Respiratory: Negative.     Cardiovascular: Negative.    Gastrointestinal: Negative.    Genitourinary: Negative.    Musculoskeletal: Negative.    Skin: Negative.    Neurological: Negative.      Past Medical History[1]  Past Surgical History[2]  Family History[3]  Social History[4]  Medications[5]  No Known Allergies  Immunization History   Administered Date(s) Administered   • Influenza Injectable, MDCK, Preservative Free, Quadrivalent, 0.5 mL 10/10/2020   • Influenza Quadrivalent Preservative Free 3 years and older IM 10/05/2016   • Influenza, recombinant, quadrivalent,injectable, preservative free 10/07/2019   • Influenza, seasonal, injectable 1965, 10/05/2014, 10/07/2015, 10/01/2018   • Pneumococcal Polysaccharide PPV23 2011   • Tdap 2014     Objective   /82 (BP Location: Left arm, Patient Position: Sitting, Cuff Size: Adult)   Pulse 58   Temp 98.3 °F (36.8 °C) (Temporal)   Resp 14   Ht 6' 5\" (1.956 m)   Wt (!) 145 kg (319 lb)   SpO2 95%   BMI 37.83 kg/m²     Physical Exam  Constitutional:       General: He is not in acute distress.     Appearance: He is obese. He is not ill-appearing.     Cardiovascular:      Rate and Rhythm: Regular rhythm. Bradycardia present.   Pulmonary:      Effort: Pulmonary effort is normal. No respiratory distress.     Skin:     Coloration: Skin is not pale. "     Neurological:      Mental Status: He is alert and oriented to person, place, and time.                [1]  Past Medical History:  Diagnosis Date   • Asthma    • Dental disorder     Last Assessed: 2015   • Hypertension    [2]  Past Surgical History:  Procedure Laterality Date   • NO PAST SURGERIES     [3]  Family History  Problem Relation Name Age of Onset   • Coronary artery disease Mother     • No Known Problems Father     [4]  Social History  Tobacco Use   • Smoking status: Former     Current packs/day: 0.00     Types: Cigarettes     Start date: 1980     Quit date: 10/10/2000     Years since quittin.6   • Smokeless tobacco: Never   Vaping Use   • Vaping status: Never Used   Substance and Sexual Activity   • Alcohol use: Yes     Comment: 6 pack on weekends    • Drug use: Never   [5]  Current Outpatient Medications on File Prior to Visit   Medication Sig   • albuterol (PROVENTIL HFA,VENTOLIN HFA) 90 mcg/act inhaler USE 2 INHALATIONS EVERY 4 HOURS AS NEEDED FOR WHEEZING   • amLODIPine (NORVASC) 10 mg tablet Take 1 tablet (10 mg total) by mouth daily   • anastrozole (ARIMIDEX) 1 mg tablet Take by mouth in the morning. 1/2 tab weekly.   • Dulera 200-5 MCG/ACT inhaler USE 2 INHALATIONS TWICE A DAY (RINSE MOUTH AFTER USE)   • losartan-hydrochlorothiazide (HYZAAR) 100-12.5 MG per tablet TAKE 1 TABLET DAILY   • nebivolol (BYSTOLIC) 10 mg tablet Take 1 tablet (10 mg total) by mouth daily   • sildenafil (VIAGRA) 100 mg tablet Take 1 tablet (100 mg total) by mouth daily as needed for erectile dysfunction   • testosterone cypionate (DEPO-TESTOSTERONE) 200 mg/mL SOLN Inject 200 mg into a muscle once

## 2025-07-29 DIAGNOSIS — I10 BENIGN ESSENTIAL HYPERTENSION: ICD-10-CM

## 2025-07-30 RX ORDER — LOSARTAN POTASSIUM AND HYDROCHLOROTHIAZIDE 12.5; 1 MG/1; MG/1
1 TABLET ORAL DAILY
Qty: 90 TABLET | Refills: 1 | Status: SHIPPED | OUTPATIENT
Start: 2025-07-30